# Patient Record
Sex: FEMALE | Race: BLACK OR AFRICAN AMERICAN | Employment: FULL TIME | URBAN - METROPOLITAN AREA
[De-identification: names, ages, dates, MRNs, and addresses within clinical notes are randomized per-mention and may not be internally consistent; named-entity substitution may affect disease eponyms.]

---

## 2019-02-07 ENCOUNTER — OFFICE VISIT (OUTPATIENT)
Dept: PRIMARY CARE CLINIC | Age: 29
End: 2019-02-07

## 2019-02-07 VITALS
OXYGEN SATURATION: 99 % | HEIGHT: 67 IN | HEART RATE: 71 BPM | WEIGHT: 198.4 LBS | TEMPERATURE: 97.9 F | DIASTOLIC BLOOD PRESSURE: 72 MMHG | BODY MASS INDEX: 31.14 KG/M2 | SYSTOLIC BLOOD PRESSURE: 120 MMHG | RESPIRATION RATE: 16 BRPM

## 2019-02-07 DIAGNOSIS — F51.02 ADJUSTMENT INSOMNIA: ICD-10-CM

## 2019-02-07 DIAGNOSIS — H61.23 BILATERAL IMPACTED CERUMEN: ICD-10-CM

## 2019-02-07 DIAGNOSIS — F32.9 REACTIVE DEPRESSION: ICD-10-CM

## 2019-02-07 DIAGNOSIS — J45.40 MODERATE PERSISTENT ASTHMA WITHOUT COMPLICATION: Primary | ICD-10-CM

## 2019-02-07 RX ORDER — FLUTICASONE PROPIONATE AND SALMETEROL 500; 50 UG/1; UG/1
1 POWDER RESPIRATORY (INHALATION) EVERY 12 HOURS
COMMUNITY
End: 2019-08-23 | Stop reason: DRUGHIGH

## 2019-02-07 RX ORDER — FLUOXETINE 10 MG/1
10 CAPSULE ORAL DAILY
Qty: 30 CAP | Refills: 0 | Status: SHIPPED | OUTPATIENT
Start: 2019-02-07 | End: 2019-03-09

## 2019-02-07 RX ORDER — MONTELUKAST SODIUM 10 MG/1
10 TABLET ORAL DAILY
COMMUNITY
End: 2020-01-22 | Stop reason: SDUPTHER

## 2019-02-07 RX ORDER — TRAZODONE HYDROCHLORIDE 100 MG/1
100 TABLET ORAL
Qty: 30 TAB | Refills: 0 | Status: SHIPPED | OUTPATIENT
Start: 2019-02-07 | End: 2019-03-09

## 2019-02-07 NOTE — PROGRESS NOTES
Written by Andrea Christine, as dictated by Dr. Collin Briceno MD. 
 
85 [facility] Kapil Smith is a 30 y.o. female. HPI The patient comes in today to establish care. She moved to the area from [place] about 2 years ago and was seeing a PCP at the [facility] in [place]. When she previously lived in [place] she was under the care of the [facility]. Patient has asthma and is followed by allergy, noting that her asthma is allergy-induced. She is taking Singulair 10 mg 1 tab PO daily, inhaler as needed, and Allegra. Patient would like to know if she can take Allegra-D as she has been congested recently. Her allergist wanted her to try a nasal spray, but she does stopped using it as she was getting nosebleeds. She notes that her mother was recently diagnosed with pancreatic cancer and she has been taking care of her. Patient has been feeling down and notes that she has not been sleeping well. The patient notes that prior to her mother's diagnosis she had insomnia. She was previously prescribed Ambien, which caused hallucinations. Patient notes that she was also prescribed an antidepressant in the past, but she notes that it caused depression which was not worth the improvement in sleep. Patient notes that trazodone sounds familiar but she is not sure if that was the medication she took. OTC melatonin, ZQuil, & NyQuil have not provided relief. With lack of sleep she has been struggling to focus. Pt was diagnosed with PTSD and has anxiety with crowds. She saw a therapist once after moving to [place], but she has been looking for a new therapist. 
 
She also notes that she has been eating a lot due to depression and anxiety. Pt weighs 198 lbs today and would like to lose weight. She requests an ear wash today as she has wax in her L ear which is bothering her. Current Outpatient Medications on File Prior to Visit Medication Sig Dispense Refill  montelukast (SINGULAIR) 10 mg tablet Take 10 mg by mouth daily.  fluticasone-salmeterol (ADVAIR DISKUS) 500-50 mcg/dose diskus inhaler Take 1 Puff by inhalation every twelve (12) hours. No current facility-administered medications on file prior to visit. Allergies Allergen Reactions  Aspirin Nausea and Vomiting  Penicillins Rash  Percocet [Oxycodone-Acetaminophen] Itching Past Medical History:  
Diagnosis Date  Asthma Family History Problem Relation Age of Onset  Cancer Mother  Hypertension Father  Diabetes Father  Diabetes Brother  Asthma Maternal Grandmother  No Known Problems Paternal Grandmother  Hypertension Brother Social History Socioeconomic History  Marital status:  Spouse name: Not on file  Number of children: Not on file  Years of education: Not on file  Highest education level: Not on file Social Needs  Financial resource strain: Not on file  Food insecurity - worry: Not on file  Food insecurity - inability: Not on file  Transportation needs - medical: Not on file  Transportation needs - non-medical: Not on file Occupational History  Not on file Tobacco Use  Smoking status: Never Smoker  Smokeless tobacco: Never Used Substance and Sexual Activity  Alcohol use: Yes Alcohol/week: 1.2 oz Types: 1 Glasses of wine, 1 Shots of liquor per week  Drug use: Yes Types: Marijuana Comment: Recreational/Insomnia  Sexual activity: Yes  
  Partners: Female Birth control/protection: None Other Topics Concern  Not on file Social History Narrative  Not on file Review of Systems Constitutional: Negative for malaise/fatigue. HENT: Positive for congestion and ear pain (L fullness). Eyes: Negative for blurred vision and pain. Respiratory: Negative for cough and shortness of breath. Cardiovascular: Negative for chest pain and palpitations. Gastrointestinal: Negative for abdominal pain and heartburn. Genitourinary: Negative for frequency and urgency. Musculoskeletal: Negative for joint pain and myalgias. Neurological: Negative for dizziness, tingling, sensory change, weakness and headaches. Endo/Heme/Allergies: Positive for environmental allergies. Psychiatric/Behavioral: Positive for depression. Negative for memory loss and substance abuse. The patient has insomnia. Visit Vitals /72 (BP 1 Location: Left arm, BP Patient Position: Sitting) Pulse 71 Temp 97.9 °F (36.6 °C) (Oral) Resp 16 Ht 5' 7\" (1.702 m) Wt 198 lb 6.4 oz (90 kg) LMP 01/10/2019 (Within Days) SpO2 99% BMI 31.07 kg/m² Physical Exam  
Constitutional: She is oriented to person, place, and time. She appears well-developed. No distress. Obese HENT:  
Right Ear: Tympanic membrane, external ear and ear canal normal.  
Left Ear: External ear normal.  
L cerumen impaction Eyes: Conjunctivae and EOM are normal. Right eye exhibits no discharge. Left eye exhibits no discharge. Neck: Normal range of motion. Neck supple. Cardiovascular: Normal rate, regular rhythm and normal heart sounds. Pulmonary/Chest: Effort normal and breath sounds normal. She has no wheezes. Abdominal: Soft. Bowel sounds are normal. There is no tenderness. Lymphadenopathy:  
  She has no cervical adenopathy. Neurological: She is alert and oriented to person, place, and time. Skin: She is not diaphoretic. Psychiatric: She has a normal mood and affect. Her behavior is normal.  
Nursing note and vitals reviewed. ASSESSMENT and PLAN 
  ICD-10-CM ICD-9-CM 1. Moderate persistent asthma without complication M23.12 351.45 Followed by allergist and compliant on Singulair and Advair and taking Allegra.  Discussed that she can take Allegra-D for congestion, but she should not take it consistently as it can elevate BP. 2. Bilateral impacted cerumen H61.23 380.4 Time out: Immediately prior to procedure a \"time out\" was called to verify the correct patient, procedure, equipment, support staff and site/side marked as required. Ear wash performed in office. Pt tolerated well but ear wash was not successful. Instructions given to use OTC Debrox to clean her ears. She should not use q-tips. 3. Reactive depression F32.9 300.4 FLUoxetine (PROZAC) 10 mg capsule sent to pharmacy. REFERRAL TO PSYCHOLOGY Prozac 10 mg prescribed, she should take 1 tab PO in the morning. Potential side effects were discussed. She should start Prozac 1 week after starting trazodone. If she does not notice improvement after 2 weeks, she can take 2 tabs. She should follow-up in 1 month for a physical exam.  
4. Adjustment insomnia F51.02 307.41 traZODone (DESYREL) 100 mg tablet sent to pharmacy. REFERRAL TO PSYCHOLOGY Trazodone 100 mg prescribed. Potential side effects were discussed. She should take 1 tab at night for insomnia. If her sleep does not improve, she can take 2 tabs at night. Referred to psychology. This plan was reviewed with the patient and patient agrees. All questions were answered. This scribe documentation was reviewed by me and accurately reflects the examination and decisions made by me. This note will not be viewable in [address]

## 2019-02-07 NOTE — PROGRESS NOTES
Visit Vitals /72 (BP 1 Location: Left arm, BP Patient Position: Sitting) Pulse 71 Temp 97.9 °F (36.6 °C) (Oral) Resp 16 Ht 5' 7\" (1.702 m) Wt 198 lb 6.4 oz (90 kg) SpO2 99% BMI 31.07 kg/m² Chief Complaint Patient presents with [facility] Establish Care Needs a PCP  Asthma Sees Dr. Barbara Ervin in [place] Needed Ear Wash; Completed w/ no results, impacted L ear, advise use of Debrox, patient education provided, avoid Q-tips New patient patient presents A&Ox3, with c/o bilateral ear fullness. R ear has a small amount of soft cerumen, TM partially visualized. L ear is occluded with hard cerumen, unable to visualize TM, ear irrigation attempted with little results. Patient advised to use Debrox, patient education completed regarding use. Patient verbalizes understanding. Patient states she has a family h/o CA on her Mother's side. Mother currently has pancreatic cancer, and had breast and cervical cancer in her 29's. Maternal Grandmother was recently diagnosed with breast cancer and underwent a lumpectomy. Authorization to [address] [place] [address]. 1. Have you been to the ER, urgent care clinic since your last visit? Hospitalized since your last visit? Denies 2. Have you seen or consulted any other health care providers outside of the [address] [place] [address] since your last visit? Include any pap smears or colon screening. Patient is new to [address] [place] [address].

## 2019-03-05 ENCOUNTER — TELEPHONE (OUTPATIENT)
Dept: PRIMARY CARE CLINIC | Age: 29
End: 2019-03-05

## 2019-03-06 NOTE — TELEPHONE ENCOUNTER
Called and spoke with patient and offered to send a copy of psychology referral and [facility] [address] resources in the mail to her. Patient was agreeable to this and expressed thanks. No other questions or concerns voiced. End of encounter.

## 2019-03-12 ENCOUNTER — OFFICE VISIT (OUTPATIENT)
Dept: PRIMARY CARE CLINIC | Age: 29
End: 2019-03-12

## 2019-03-12 VITALS
HEART RATE: 80 BPM | HEIGHT: 67 IN | DIASTOLIC BLOOD PRESSURE: 65 MMHG | BODY MASS INDEX: 30.83 KG/M2 | SYSTOLIC BLOOD PRESSURE: 113 MMHG | OXYGEN SATURATION: 100 % | WEIGHT: 196.4 LBS | RESPIRATION RATE: 16 BRPM | TEMPERATURE: 97.7 F

## 2019-03-12 DIAGNOSIS — F51.02 ADJUSTMENT INSOMNIA: ICD-10-CM

## 2019-03-12 DIAGNOSIS — F32.9 REACTIVE DEPRESSION: Primary | ICD-10-CM

## 2019-03-12 PROBLEM — F32.2 DEPRESSION, MAJOR, SINGLE EPISODE, SEVERE (HCC): Status: ACTIVE | Noted: 2019-03-12

## 2019-03-12 RX ORDER — FEXOFENADINE HCL AND PSEUDOEPHEDRINE HCI 180; 240 MG/1; MG/1
1 TABLET, EXTENDED RELEASE ORAL DAILY
COMMUNITY
End: 2020-03-19 | Stop reason: SDUPTHER

## 2019-03-12 NOTE — PROGRESS NOTES
[facility]   Tania Aguayo [address], [address] 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 states that she is going through just losing her mother after being a caregiver for her mother and it is very hard to do her job listening to people reporting the death and how they . Liliane Rosario is a 30 y.o. 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 states that she is going through just losing her mother after being a caregiver for her mother and it is very hard to do her job listening to people reporting the death and how they . ). HPI:  The patient presents to discuss [place] paperwork- she wants to take 2.5 weeks off related to reactive depression from the loss of her mother recently. She is experiencing depression, including feelings of low mood and anxiety which is manifesting as frequent panic attacks 3 times+ weekly lasting 20-30 mins where she has shortness of breath. She is sleeping 1-2 hrs nightly. 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 She has trouble answering those calls. She denies SI or HI today. She saw Dr Ju Duncan  and was recommended to pursue counseling and referred to Dr Barbara Trujillo. She was also prescribed Prozac for reactive depression and trazodone to help with insomnia. She denies starting either medication but will be picking the medicines up today. There are no active problems to display for this patient. Past Medical History:   Diagnosis Date    Asthma      History reviewed. No pertinent surgical history.   Social History     Socioeconomic History    Marital status:      Spouse name: Not on file    Number of children: Not on file    Years of education: Not on file    Highest education level: Not on file   Social Needs    Financial resource strain: Not on file    Food insecurity - worry: Not on file    Food insecurity - inability: Not on file    Transportation needs - medical: Not on file   [email or web address] needs - non-medical: Not on file   Occupational History    Not on file   Tobacco Use    Smoking status: Never Smoker    Smokeless tobacco: Never Used   Substance and Sexual Activity    Alcohol use: Yes     Alcohol/week: 1.2 oz     Types: 1 Glasses of wine, 1 Shots of liquor per week    Drug use: Yes     Types: Marijuana     Comment: Recreational/Insomnia     Sexual activity: Yes     Partners: Female     Birth control/protection: None   Other Topics Concern    Not on file   Social History Narrative    Not on file     Family History   Problem Relation Age of Onset    Cancer Mother     Hypertension Father     Diabetes Father     Diabetes Brother     Asthma Maternal Grandmother     No Known Problems Paternal Grandmother     Hypertension Brother      Allergies   Allergen Reactions    Aspirin Nausea and Vomiting    Peanut Anaphylaxis     And tree nuts    Penicillins Rash    Percocet [Oxycodone-Acetaminophen] Itching    Sulfa (Sulfonamide Antibiotics) Other (comments)     States it was a childhood reaction does not remember       Current Outpatient Medications   Medication Sig Dispense Refill    fexofenadine-pseudoephedrine (ALLEGRA-D 24 HOUR) 180-240 mg per tablet Take 1 Tab by mouth daily.  fluoxetine HCl (PROZAC PO) Take  by mouth.  montelukast (SINGULAIR) 10 mg tablet Take 10 mg by mouth daily.  fluticasone-salmeterol (ADVAIR DISKUS) 500-50 mcg/dose diskus inhaler Take 1 Puff by inhalation every twelve (12) hours. The medications were reviewed and updated in the medical record.   The past medical history, past surgical history, and family history were reviewed and updated in the medical record. REVIEW OF SYSTEMS   Review of Systems   Constitutional: Negative for malaise/fatigue. HENT: Negative for congestion. Eyes: Negative for blurred vision and pain. Respiratory: Negative for cough and shortness of breath. Cardiovascular: Negative for chest pain and palpitations. Gastrointestinal: Negative for abdominal pain and heartburn. Genitourinary: Negative for frequency and urgency. Musculoskeletal: Negative for joint pain and myalgias. Neurological: Negative for dizziness, tingling, sensory change, weakness and headaches. Psychiatric/Behavioral: Positive for depression. Negative for memory loss and substance abuse. The patient has insomnia. PHYSICAL EXAM     Visit Vitals  /65 (BP 1 Location: Right arm, BP Patient Position: Sitting)   Pulse 80   Temp 97.7 °F (36.5 °C) (Oral)   Resp 16   Ht 5' 7\" (1.702 m)   Wt 196 lb 6.4 oz (89.1 kg)   LMP 03/05/2019   SpO2 100%   BMI 30.76 kg/m²       Physical Exam   Constitutional: She is oriented to person, place, and time and well-developed, well-nourished, and in no distress. HENT:   Head: Normocephalic and atraumatic. Right Ear: External ear normal.   Left Ear: External ear normal.   Cardiovascular: Normal rate, regular rhythm and normal heart sounds. Pulmonary/Chest: Effort normal and breath sounds normal.   Musculoskeletal: Normal range of motion. She exhibits no edema. Neurological: She is alert and oriented to person, place, and time. Gait normal.   Skin: Skin is warm and dry. Psychiatric: Judgment normal. She exhibits a depressed mood. She expresses no homicidal and no suicidal ideation. She has a flat affect. Nursing note and vitals reviewed. ASSESSMENT/ PLAN   Diagnoses and all orders for this visit:    1. Reactive depression    2. Adjustment insomnia    Encouraged starting medication- Prozac and Trazodone today.  She has  Heriberto's number and plans on scheduling with her in the next two weeks. Filled out [place] paperwork today. She is requesting 2 and 1/2 weeks which seems reasonable to set up counseling and start medication. I would like to see her back in 1 month or sooner if her mood/ anxiety worsens. Provided \"Coping Strategies\" handout by [employer]. Follow-up Disposition:  Return in about 1 month (around 4/12/2019), or if symptoms worsen or fail to improve, for Anxiety, Depression . Disclaimer:  Advised patient to call back or return to office if symptoms worsen/change/persist.  Discussed expected course/resolution/complications of diagnosis in detail with patient.     Medication risks/benefits/alternatives discussed with patient. Patient was given an after visit summary which includes diagnoses, current medications, & vitals.      Discussed patient instructions and advised to read to all patient instructions regarding care.      Patient expressed understanding with the diagnosis and plan. This note will not be viewable in [address]         Lars Ruiz NP  3/12/2019        (This document has been electronically signed)

## 2019-03-12 NOTE — PROGRESS NOTES
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 states that she is going through just losing her mother after being a caregiver for her mother and it is very hard to do her job listening to people reporting the death and how they .

## 2019-03-20 ENCOUNTER — TELEPHONE (OUTPATIENT)
Dept: PRIMARY CARE CLINIC | Age: 29
End: 2019-03-20

## 2019-03-25 ENCOUNTER — OFFICE VISIT (OUTPATIENT)
Dept: PRIMARY CARE CLINIC | Age: 29
End: 2019-03-25

## 2019-03-25 VITALS
SYSTOLIC BLOOD PRESSURE: 108 MMHG | HEIGHT: 67 IN | TEMPERATURE: 98.4 F | WEIGHT: 198.6 LBS | HEART RATE: 86 BPM | OXYGEN SATURATION: 98 % | RESPIRATION RATE: 16 BRPM | DIASTOLIC BLOOD PRESSURE: 72 MMHG | BODY MASS INDEX: 31.17 KG/M2

## 2019-03-25 DIAGNOSIS — F51.01 PRIMARY INSOMNIA: ICD-10-CM

## 2019-03-25 DIAGNOSIS — F32.89 OTHER DEPRESSION: Primary | ICD-10-CM

## 2019-03-25 DIAGNOSIS — R09.1 PLEURISY: ICD-10-CM

## 2019-03-25 DIAGNOSIS — G43.019 INTRACTABLE MIGRAINE WITHOUT AURA AND WITHOUT STATUS MIGRAINOSUS: ICD-10-CM

## 2019-03-25 RX ORDER — SUMATRIPTAN 50 MG/1
50 TABLET, FILM COATED ORAL
Qty: 10 TAB | Refills: 0 | Status: SHIPPED | OUTPATIENT
Start: 2019-03-25 | End: 2019-03-25

## 2019-03-25 RX ORDER — METHYLPREDNISOLONE 4 MG/1
TABLET ORAL
Qty: 1 DOSE PACK | Refills: 0 | Status: SHIPPED | OUTPATIENT
Start: 2019-03-25 | End: 2019-08-23 | Stop reason: ALTCHOICE

## 2019-03-25 NOTE — PROGRESS NOTES
Written by Earl Lopez, as dictated by Dr. Jeniffer Bravo MD. 
 
85 [facility] Yoshi Herrmann is a 30 y.o. female. HPI The patient presents today for a medication evaluation. After seeing NP Josué Correia on 03/12/2019 she started Prozac and Trazodone. Since starting Prozac she has felt more emotionless but no longer feels sad. She has her second appointment with Siena Correia (psychology) later today which she notes has been helping. Trazodone helps her to sleep at night, but she notes that she feels groggy in the morning. Her paperwork was completed and her leave from work ends on 04/01/2019. Since she was seen by NP Haydee Dodson and started taking Prozac 10 mg and 1/2 tab Trazodone 100 mg, she has had 3 migraines. Pt normally does not have migraines so close together. The patient notes that OTC benadryl helps her to fall asleep and sleep through the migraine. Tylenol also provides relief, but she notes that she has to take more than 1 tabs daily. Once she tried Excedrin, but she did not loike how it made her feel. Pt also tried ibuprofen in the past, but notes that it made her swell so she does not take it. She has not tried Imitrex. She has been having \"shallow chest pains\" which is concerning to her. The patient notes that the pain was sharp when she took a deep breath. Pt reports that her pain is improving. Patient notes that recently she has been a little congested, which she attributes to allergies. Compliant on Singulair. The patient notes that she took [address] for nausea. After vomiting her nausea has  improved. Patient Active Problem List  
Diagnosis Code  Depression, major, single episode, severe ([facility] F32.2 Current Outpatient Medications on File Prior to Visit Medication Sig Dispense Refill  fexofenadine-pseudoephedrine (ALLEGRA-D 24 HOUR) 180-240 mg per tablet Take 1 Tab by mouth daily.  fluoxetine HCl (PROZAC PO) Take  by mouth.  montelukast (SINGULAIR) 10 mg tablet Take 10 mg by mouth daily.  fluticasone-salmeterol (ADVAIR DISKUS) 500-50 mcg/dose diskus inhaler Take 1 Puff by inhalation every twelve (12) hours. No current facility-administered medications on file prior to visit. Allergies Allergen Reactions  Aspirin Nausea and Vomiting  Peanut Anaphylaxis And tree nuts  Penicillins Rash  Percocet [Oxycodone-Acetaminophen] Itching  Sulfa (Sulfonamide Antibiotics) Other (comments) States it was a childhood reaction does not remember Past Medical History:  
Diagnosis Date  Asthma Family History Problem Relation Age of Onset  Cancer Mother  Hypertension Father  Diabetes Father  Diabetes Brother  Asthma Maternal Grandmother  No Known Problems Paternal Grandmother  Hypertension Brother Social History Socioeconomic History  Marital status:  Spouse name: Not on file  Number of children: Not on file  Years of education: Not on file  Highest education level: Not on file Occupational History  Not on file Social Needs  Financial resource strain: Not on file  Food insecurity:  
  Worry: Not on file Inability: Not on file  Transportation needs:  
  Medical: Not on file Non-medical: Not on file Tobacco Use  Smoking status: Never Smoker  Smokeless tobacco: Never Used Substance and Sexual Activity  Alcohol use: Yes Alcohol/week: 1.2 oz Types: 1 Glasses of wine, 1 Shots of liquor per week  Drug use: Yes Types: Marijuana Comment: Recreational/Insomnia  Sexual activity: Yes  
  Partners: Female Birth control/protection: None Lifestyle  Physical activity:  
  Days per week: Not on file Minutes per session: Not on file  Stress: Not on file Relationships  Social connections:  
  Talks on phone: Not on file Gets together: Not on file Attends [employer] orthodox service: Not on file Active member of club or organization: Not on file Attends meetings of clubs or organizations: Not on file Relationship status: Not on file  Intimate partner violence:  
  Fear of current or ex partner: Not on file Emotionally abused: Not on file Physically abused: Not on file Forced sexual activity: Not on file Other Topics Concern  Not on file Social History Narrative  Not on file Review of Systems HENT: Positive for congestion. Respiratory: Negative for cough and shortness of breath. Gastrointestinal: Positive for nausea. Negative for abdominal pain and heartburn. Musculoskeletal: Negative for joint pain and myalgias. Neurological: Positive for headaches. Negative for dizziness, tingling, sensory change and weakness. Endo/Heme/Allergies: Positive for environmental allergies. Psychiatric/Behavioral: Positive for depression (improved with prozac). Negative for memory loss and substance abuse. The patient has insomnia (improved with trazodone). Visit Vitals /72 (BP 1 Location: Left arm, BP Patient Position: Sitting) Pulse 86 Temp 98.4 °F (36.9 °C) (Oral) Resp 16 Ht 5' 7\" (1.702 m) Wt 198 lb 9.6 oz (90.1 kg) LMP 03/05/2019 SpO2 98% BMI 31.11 kg/m² Physical Exam  
Constitutional: She is oriented to person, place, and time. She appears well-developed. No distress. Obese HENT:  
Right Ear: External ear normal.  
Left Ear: External ear normal.  
Nose: Right sinus exhibits no maxillary sinus tenderness. Left sinus exhibits no maxillary sinus tenderness. Eyes: Conjunctivae and EOM are normal. Right eye exhibits no discharge. Left eye exhibits no discharge. Neck: Normal range of motion. Neck supple. Cardiovascular: Normal rate and regular rhythm. Pulmonary/Chest: Effort normal and breath sounds normal. She has no wheezes. Abdominal: Soft. Bowel sounds are normal. There is no tenderness. Lymphadenopathy:  
  She has no cervical adenopathy. Neurological: She is alert and oriented to person, place, and time. No cranial nerve deficit. Skin: She is not diaphoretic. Psychiatric: She has a normal mood and affect. Her behavior is normal.  
Nursing note and vitals reviewed. ASSESSMENT and PLAN 
  ICD-10-CM ICD-9-CM 1. Other depression F32.89 311 She should continue Prozac 10 mg daily and continue seeing Kisha Li. 2. Pleurisy R09.1 511.0 methylPREDNISolone (MEDROL DOSEPACK) 4 mg tablet sent to pharmacy. Medrol dosepack prescribed. 3. Primary insomnia F51.01 307.42 Doing well on 1/2 tab Trazodone 100 mg. No change to medication at this time. 4. Intractable migraine without aura and without status migrainosus G43.019 346.11 SUMAtriptan (IMITREX) 50 mg tablet sent to pharmacy. Imitrex 50 mg prescribed. Potential side effects were discussed. She should take 1 tab PO when her headache presents. If her headache does not resolved she can take a second tablet. She should not take more than 2 tabs daily. This plan was reviewed with the patient and patient agrees. All questions were answered. This scribe documentation was reviewed by me and accurately reflects the examination and decisions made by me. This note will not be viewable in [address]

## 2019-03-25 NOTE — PROGRESS NOTES
Visit Vitals /72 (BP 1 Location: Left arm, BP Patient Position: Sitting) Pulse 86 Temp 98.4 °F (36.9 °C) (Oral) Resp 16 Ht 5' 7\" (1.702 m) Wt 198 lb 9.6 oz (90.1 kg) SpO2 98% BMI 31.11 kg/m² Chief Complaint Patient presents with  Medication Evaluation Prozac and Trazodone  Nasal Congestion  
  x 2 weeks  Migraine  
   x 2 weeks  Nausea  
  x 1 month 1. Have you been to the ER, urgent care clinic since your last visit? Hospitalized since your last visit? Denies 2. Have you seen or consulted any other health care providers outside of the [address] [place] [address] since your last visit? Include any pap smears or colon screening. Denies

## 2019-08-23 ENCOUNTER — OFFICE VISIT (OUTPATIENT)
Dept: PRIMARY CARE CLINIC | Age: 29
End: 2019-08-23

## 2019-08-23 VITALS
SYSTOLIC BLOOD PRESSURE: 105 MMHG | HEART RATE: 99 BPM | WEIGHT: 200.6 LBS | BODY MASS INDEX: 31.48 KG/M2 | TEMPERATURE: 98.2 F | DIASTOLIC BLOOD PRESSURE: 70 MMHG | RESPIRATION RATE: 16 BRPM | OXYGEN SATURATION: 98 % | HEIGHT: 67 IN

## 2019-08-23 DIAGNOSIS — J45.40 MODERATE PERSISTENT ASTHMA WITHOUT COMPLICATION: ICD-10-CM

## 2019-08-23 DIAGNOSIS — F41.9 ANXIETY AND DEPRESSION: ICD-10-CM

## 2019-08-23 DIAGNOSIS — R07.89 CHEST TIGHTNESS: Primary | ICD-10-CM

## 2019-08-23 DIAGNOSIS — F51.01 PRIMARY INSOMNIA: ICD-10-CM

## 2019-08-23 DIAGNOSIS — F32.A ANXIETY AND DEPRESSION: ICD-10-CM

## 2019-08-23 PROBLEM — F32.2 DEPRESSION, MAJOR, SINGLE EPISODE, SEVERE (HCC): Status: RESOLVED | Noted: 2019-03-12 | Resolved: 2019-08-23

## 2019-08-23 RX ORDER — FLUTICASONE PROPIONATE AND SALMETEROL 250; 50 UG/1; UG/1
1 POWDER RESPIRATORY (INHALATION) EVERY 12 HOURS
Qty: 60 EACH | Refills: 2 | Status: SHIPPED | OUTPATIENT
Start: 2019-08-23 | End: 2020-01-15

## 2019-08-23 RX ORDER — TRAZODONE HYDROCHLORIDE 100 MG/1
100 TABLET ORAL
Qty: 30 TAB | Refills: 2 | Status: SHIPPED | OUTPATIENT
Start: 2019-08-23 | End: 2019-11-12 | Stop reason: SDUPTHER

## 2019-08-23 RX ORDER — ALBUTEROL SULFATE 90 UG/1
1 AEROSOL, METERED RESPIRATORY (INHALATION)
Qty: 1 INHALER | Refills: 2 | Status: SHIPPED | OUTPATIENT
Start: 2019-08-23 | End: 2019-11-12 | Stop reason: SDUPTHER

## 2019-08-23 RX ORDER — TRAZODONE HYDROCHLORIDE 50 MG/1
50 TABLET ORAL
COMMUNITY
End: 2021-08-16

## 2019-08-23 RX ORDER — CITALOPRAM 10 MG/1
10 TABLET ORAL DAILY
Qty: 30 TAB | Refills: 0 | Status: SHIPPED | OUTPATIENT
Start: 2019-08-23 | End: 2019-09-17 | Stop reason: SDUPTHER

## 2019-08-23 RX ORDER — FLUTICASONE PROPIONATE AND SALMETEROL 500; 50 UG/1; UG/1
1 POWDER RESPIRATORY (INHALATION) EVERY 12 HOURS
Status: CANCELLED | OUTPATIENT
Start: 2019-08-23

## 2019-08-23 NOTE — PROGRESS NOTES
Written by Georgette Queen, as dictated by Dr. Madelin Campa MD.    Chintan Farmer is a 30 y.o. female. HPI  The patient presents today for a follow-up on Trazodone. She reports that she is not sleeping through the night, even while on Trazodone 50 mg. She is taking 1 tab at night and reports that it had worked in the past but now gets up after 4-5 hours. She has been using OTC Benadryl to help her get back to sleep. She reports that she has been having chest pain, especially when breathing in. She reports that she ran out of Advair/Wixela inhaler. She reports that she has also been traveling a lot. She has been using her albuterol inhaler daily, which improves her chest pain for few hours. She is still taking Singular 10 mg. She reports that her anxiety has not been great, but she is also not taking Prozac. She stopped taking it 1 month at all. She reports that she went from feeling nothing to feeling everything. She did not like the way it made her feel, like a robot. Patient Active Problem List   Diagnosis Code   (none) - all problems resolved or deleted        Current Outpatient Medications on File Prior to Visit   Medication Sig Dispense Refill    traZODone (DESYREL) 50 mg tablet Take 50 mg by mouth.  fexofenadine-pseudoephedrine (ALLEGRA-D 24 HOUR) 180-240 mg per tablet Take 1 Tab by mouth daily.  montelukast (SINGULAIR) 10 mg tablet Take 10 mg by mouth daily. No current facility-administered medications on file prior to visit.         Allergies   Allergen Reactions    Aspirin Nausea and Vomiting    Peanut Anaphylaxis     And tree nuts    Penicillins Rash    Percocet [Oxycodone-Acetaminophen] Itching    Sulfa (Sulfonamide Antibiotics) Other (comments)     States it was a childhood reaction does not remember       Past Medical History:   Diagnosis Date    Asthma        Family History   Problem Relation Age of Onset    Cancer Mother    Boaz Joy Hypertension Father     Diabetes Father     Diabetes Brother     Asthma Maternal Grandmother     No Known Problems Paternal Grandmother     Hypertension Brother        Social History     Socioeconomic History    Marital status:      Spouse name: Not on file    Number of children: Not on file    Years of education: Not on file    Highest education level: Not on file   Occupational History    Not on file   Social Needs    Financial resource strain: Not on file    Food insecurity:     Worry: Not on file     Inability: Not on file    Transportation needs:     Medical: Not on file     Non-medical: Not on file   Tobacco Use    Smoking status: Never Smoker    Smokeless tobacco: Never Used   Substance and Sexual Activity    Alcohol use: Yes     Alcohol/week: 2.0 standard drinks     Types: 1 Glasses of wine, 1 Shots of liquor per week    Drug use: Yes     Types: Marijuana     Comment: Recreational/Insomnia     Sexual activity: Yes     Partners: Female     Birth control/protection: None   Lifestyle    Physical activity:     Days per week: Not on file     Minutes per session: Not on file    Stress: Not on file   Relationships    Social connections:     Talks on phone: Not on file     Gets together: Not on file     Attends Pentecostal service: Not on file     Active member of club or organization: Not on file     Attends meetings of clubs or organizations: Not on file     Relationship status: Not on file    Intimate partner violence:     Fear of current or ex partner: Not on file     Emotionally abused: Not on file     Physically abused: Not on file     Forced sexual activity: Not on file   Other Topics Concern    Not on file   Social History Narrative    Not on file         Review of Systems   Constitutional: Positive for malaise/fatigue. Respiratory: Positive for shortness of breath. Cardiovascular: Positive for chest pain. Negative for leg swelling. Musculoskeletal: Negative. Neurological: Negative for dizziness and headaches. Psychiatric/Behavioral: Positive for depression. The patient is nervous/anxious and has insomnia. Visit Vitals  /70 (BP 1 Location: Left arm, BP Patient Position: Sitting)   Pulse 99   Temp 98.2 °F (36.8 °C) (Oral)   Resp 16   Ht 5' 7\" (1.702 m)   Wt 200 lb 9.6 oz (91 kg)   SpO2 98%   BMI 31.42 kg/m²       Physical Exam   Constitutional: She is oriented to person, place, and time. She appears well-developed. No distress. obese   HENT:   Head: Normocephalic and atraumatic. Neck: Normal range of motion. Neck supple. Cardiovascular: Normal rate, regular rhythm, normal heart sounds and intact distal pulses. Exam reveals no gallop and no friction rub. No murmur heard. Pulmonary/Chest: Effort normal. No respiratory distress. She has decreased breath sounds. She has no wheezes. She has no rales. She exhibits no tenderness. Abdominal: Soft. Bowel sounds are normal. She exhibits no distension. There is no tenderness. Musculoskeletal: Normal range of motion. She exhibits no edema. Neurological: She is alert and oriented to person, place, and time. Skin: She is not diaphoretic. Psychiatric: She has a normal mood and affect. Her behavior is normal. Judgment and thought content normal.   Nursing note and vitals reviewed. ASSESSMENT and PLAN    ICD-10-CM ICD-9-CM    1. Chest tightness R07.89 786.59 fluticasone propion-salmeterol (ADVAIR/WIXELA) 250-50 mcg/dose diskus inhaler sent to pharmacy. Advair and Albuterol refilled. Pt was advised that chest tightness could wake her up at night. 2. Moderate persistent asthma without complication G10.31 398.92 fluticasone propion-salmeterol (ADVAIR/WIXELA) 250-50 mcg/dose diskus inhaler sent to pharmacy. albuterol (PROVENTIL HFA, VENTOLIN HFA, PROAIR HFA) 90 mcg/actuation inhaler sent to pharmacy. Advair and Albuterol refilled. Told her Albuterol is as needed not for daily use.    3. Anxiety and depression F41.9 300.00 citalopram (CELEXA) 10 mg tablet sent to pharmacy. Celexa 10 mg prescribed. Potential side effects were discussed. Pt should let me know how she feels while on this medication. Pt previously stopped Prozac due to the  side effects    F32.9 311    4. Primary insomnia F51.01 307.42 traZODone (DESYREL) 100 mg tablet sent to pharmacy. Trazodone does increased. Potential side effects were discussed. Pt should take 1 tab daily at night. Pt was advised that chest tightness could wake her up at night. This plan was reviewed with the patient and patient agrees. All questions were answered. This scribe documentation was reviewed by me and accurately reflects the examination and decisions made by me. This note will not be viewable in [address]

## 2019-08-23 NOTE — PROGRESS NOTES
Srikanth Galindo is a 30 y.o. female    Chief Complaint   Patient presents with    Medication Evaluation     trazodone     1. Have you been to the ER, urgent care clinic since your last visit? Hospitalized since your last visit? No    2. Have you seen or consulted any other health care providers outside of the [address] [place] [address] since your last visit? Include any pap smears or colon screening.  No

## 2019-09-10 ENCOUNTER — OFFICE VISIT (OUTPATIENT)
Dept: PRIMARY CARE CLINIC | Age: 29
End: 2019-09-10

## 2019-09-10 VITALS
OXYGEN SATURATION: 99 % | DIASTOLIC BLOOD PRESSURE: 71 MMHG | SYSTOLIC BLOOD PRESSURE: 117 MMHG | WEIGHT: 208 LBS | BODY MASS INDEX: 32.65 KG/M2 | RESPIRATION RATE: 18 BRPM | TEMPERATURE: 97.9 F | HEIGHT: 67 IN | HEART RATE: 88 BPM

## 2019-09-10 DIAGNOSIS — J45.40 MODERATE PERSISTENT ASTHMA WITHOUT COMPLICATION: Primary | ICD-10-CM

## 2019-09-10 DIAGNOSIS — F43.10 PTSD (POST-TRAUMATIC STRESS DISORDER): ICD-10-CM

## 2019-09-10 DIAGNOSIS — F32.A ANXIETY AND DEPRESSION: ICD-10-CM

## 2019-09-10 DIAGNOSIS — F41.9 ANXIETY AND DEPRESSION: ICD-10-CM

## 2019-09-10 DIAGNOSIS — F51.01 PRIMARY INSOMNIA: ICD-10-CM

## 2019-09-10 NOTE — LETTER
9/10/2019 3:23 PM 
 
Ms. Joes Chapa [address] [facility] [address] [place] [address] To Whom It May Concern: 
 
Jose Chapa is currently under the care of Samy Ojeda. She was suffering from PTSD and reactive depression due to the loss of her mother. She is still seeing to a psychologist. Medication was started, which has helped, but symptoms have not resolved completely. She will be referred to psychiatrist for further treatment. If there are questions or concerns please have the patient contact our office. Sincerely, Du Whyte MD

## 2019-09-10 NOTE — PROGRESS NOTES
Written by Brittany Mccoy, as dictated by Dr. Sergio Muse MD.    Marialuisa Hall is a 30 y.o. female. HPI  The patient presents today for a follow-up. 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 She was previously seen by Tam Sanchez NP for an [facility] for reactive depression due to the loss of her mother. She was started on Prozac and Trazodone on 03/12/19. She reports that the Prozac has helped, but her symptoms have not completely resolved. She was prescribed Celexa, to replace Prozac, which she has reports has improved her sadness, but not completely resolved her sadness. Trazodone has been helpful for her sleep, but she is trying to balance the medication in terms of timing to help her with her sleep, as it makes her sleepy in the morning. At this time, she is still seeing a psychologist, and will be referred to a psychiatrist for further treatment. She has her pap smear on 06/10/19, which was normal.      Patient Active Problem List   Diagnosis Code   (none) - all problems resolved or deleted        Current Outpatient Medications on File Prior to Visit   Medication Sig Dispense Refill    fluticasone propion-salmeterol (ADVAIR/WIXELA) 250-50 mcg/dose diskus inhaler Take 1 Puff by inhalation every twelve (12) hours for 90 days. 60 Each 2    albuterol (PROVENTIL HFA, VENTOLIN HFA, PROAIR HFA) 90 mcg/actuation inhaler Take 1 Puff by inhalation every six (6) hours as needed for Wheezing for up to 90 days. 1 Inhaler 2    traZODone (DESYREL) 100 mg tablet Take 1 Tab by mouth nightly for 90 days. 30 Tab 2    citalopram (CELEXA) 10 mg tablet Take 1 Tab by mouth daily for 30 days. 30 Tab 0    fexofenadine-pseudoephedrine (ALLEGRA-D 24 HOUR) 180-240 mg per tablet Take 1 Tab by mouth daily.  montelukast (SINGULAIR) 10 mg tablet Take 10 mg by mouth daily.       traZODone (DESYREL) 50 mg tablet Take 50 mg by mouth. No current facility-administered medications on file prior to visit. Allergies   Allergen Reactions    Aspirin Nausea and Vomiting    Peanut Anaphylaxis     And tree nuts    Penicillins Rash    Percocet [Oxycodone-Acetaminophen] Itching    Sulfa (Sulfonamide Antibiotics) Other (comments)     States it was a childhood reaction does not remember       Past Medical History:   Diagnosis Date    Asthma        Family History   Problem Relation Age of Onset    Cancer Mother     Hypertension Father     Diabetes Father     Diabetes Brother     Asthma Maternal Grandmother     No Known Problems Paternal Grandmother     Hypertension Brother        Social History     Socioeconomic History    Marital status:      Spouse name: Not on file    Number of children: Not on file    Years of education: Not on file    Highest education level: Not on file   Occupational History    Not on file   Social Needs    Financial resource strain: Not on file    Food insecurity:     Worry: Not on file     Inability: Not on file    Transportation needs:     Medical: Not on file     Non-medical: Not on file   Tobacco Use    Smoking status: Never Smoker    Smokeless tobacco: Never Used   Substance and Sexual Activity    Alcohol use:  Yes     Alcohol/week: 2.0 standard drinks     Types: 1 Glasses of wine, 1 Shots of liquor per week    Drug use: Yes     Types: Marijuana     Comment: Recreational/Insomnia     Sexual activity: Yes     Partners: Female     Birth control/protection: None   Lifestyle    Physical activity:     Days per week: Not on file     Minutes per session: Not on file    Stress: Not on file   Relationships    Social connections:     Talks on phone: Not on file     Gets together: Not on file     Attends Protestant service: Not on file     Active member of club or organization: Not on file     Attends meetings of clubs or organizations: Not on file     Relationship status: Not on file    Intimate partner violence:     Fear of current or ex partner: Not on file     Emotionally abused: Not on file     Physically abused: Not on file     Forced sexual activity: Not on file   Other Topics Concern    Not on file   Social History Narrative    Not on file         Review of Systems   Respiratory: Negative for shortness of breath. Musculoskeletal: Negative for joint pain and myalgias. Neurological: Negative for dizziness and headaches. Psychiatric/Behavioral: Positive for depression. Negative for substance abuse. The patient is nervous/anxious and has insomnia. Visit Vitals  /71 (BP 1 Location: Left arm, BP Patient Position: Sitting)   Pulse 88   Temp 97.9 °F (36.6 °C) (Oral)   Resp 18   Ht 5' 7\" (1.702 m)   Wt 208 lb (94.3 kg)   LMP 09/03/2019 (Approximate)   SpO2 99%   BMI 32.58 kg/m²       Physical Exam   Constitutional: She is oriented to person, place, and time. She appears well-developed and well-nourished. No distress. HENT:   Head: Normocephalic and atraumatic. Right Ear: External ear normal.   Left Ear: External ear normal.   Eyes: Pupils are equal, round, and reactive to light. Conjunctivae and EOM are normal. Right eye exhibits no discharge. Left eye exhibits no discharge. Neck: Normal range of motion. Neck supple. Cardiovascular: Normal rate, regular rhythm and normal heart sounds. Exam reveals no gallop and no friction rub. No murmur heard. Pulmonary/Chest: Effort normal and breath sounds normal. She has no wheezes. Abdominal: Soft. Bowel sounds are normal. There is no tenderness. Musculoskeletal: Normal range of motion. Neurological: She is alert and oriented to person, place, and time. She has normal reflexes. Skin: She is not diaphoretic. Psychiatric: She has a normal mood and affect. Her behavior is normal. Thought content normal.   Nursing note and vitals reviewed. ASSESSMENT and PLAN    ICD-10-CM ICD-9-CM    1.  Moderate persistent asthma without complication F71.64 158.71 Managed with Advair/Wixela inhaler. 2. PTSD (post-traumatic stress disorder) F43.10 309.81 REFERRAL TO PSYCHIATRY    Referred to psychiatry. Pt still experiences episodes of PTSD secondary to her job and reactive depression from loss of her mother. 3. Anxiety and depression F41.9 300.00 REFERRAL TO PSYCHIATRY    F32.9 311   Referred to psychiatry. Reactive depression and anxiety has not completely resolved with Celexa, or Prozac. Pt would benefit from further treatment with psychiatry. 4. Primary insomnia F51.01 307.42 Improving with Trazodone. This plan was reviewed with the patient and patient agrees. All questions were answered. This scribe documentation was reviewed by me and accurately reflects the examination and decisions made by me. This note will not be viewable in [address]

## 2019-09-10 NOTE — PROGRESS NOTES
All health maintenance and other pertinent information has been reviewed in preparation for today's office visit. Patient presents in the office today for:    Chief Complaint   Patient presents with    Follow-up     Chest tightness, Moderate Persisitent Asthma w/o complication, depression & anxiety, Insomnia    Post Traumatic Stress Disorder    Anxiety    Depression       1. Have you been to the ER, urgent care clinic since your last visit? Hospitalized since your last visit? No    2. Have you seen or consulted any other health care providers outside of the [address] [place] [address] since your last visit? Include any pap smears or colon screening.   No.

## 2019-09-16 ENCOUNTER — TELEPHONE (OUTPATIENT)
Dept: PRIMARY CARE CLINIC | Age: 29
End: 2019-09-16

## 2019-09-16 NOTE — TELEPHONE ENCOUNTER
Called and left a voice mail for patient and asked that she call back in regard to [facility] paper work. End of encounter.

## 2019-09-16 NOTE — TELEPHONE ENCOUNTER
----- Message from Bryan Zimmer sent at 9/10/2019  5:43 PM EDT -----  Regarding: Dr. Sav Shaikh: [phone]  Pt calling to report a fax number to send for her . Pt would need the claim number included when sending. Claim Number is [id number]. Please fax to [phone].

## 2019-09-17 NOTE — TELEPHONE ENCOUNTER
She did not leave any paper work with me . She asked about a work note. Please ask the scribe to complete the letter.  It`s in the chart but not completed,

## 2019-09-17 NOTE — TELEPHONE ENCOUNTER
Spoke with patient by phone on 09/16/2019 and she advises she didn't have and wasn't required to have FMLA paper work. States that she was just required to have LOV note faxed to the number provided. Paper work faxed, confirmation confirmed. End of encounter.

## 2019-09-19 ENCOUNTER — TELEPHONE (OUTPATIENT)
Dept: PRIMARY CARE CLINIC | Age: 29
End: 2019-09-19

## 2019-09-27 ENCOUNTER — TELEPHONE (OUTPATIENT)
Dept: PRIMARY CARE CLINIC | Age: 29
End: 2019-09-27

## 2019-10-01 ENCOUNTER — DOCUMENTATION ONLY (OUTPATIENT)
Dept: PRIMARY CARE CLINIC | Age: 29
End: 2019-10-01

## 2019-10-01 NOTE — PROGRESS NOTES
Spoke with physician at [facility] regarding her. Told him patient was referred to therapist & psychiatrist. She is on medications as well.

## 2019-10-07 ENCOUNTER — OFFICE VISIT (OUTPATIENT)
Dept: PRIMARY CARE CLINIC | Age: 29
End: 2019-10-07

## 2019-10-07 VITALS
HEIGHT: 67 IN | RESPIRATION RATE: 17 BRPM | BODY MASS INDEX: 33.56 KG/M2 | WEIGHT: 213.8 LBS | HEART RATE: 84 BPM | SYSTOLIC BLOOD PRESSURE: 108 MMHG | DIASTOLIC BLOOD PRESSURE: 69 MMHG | TEMPERATURE: 97.6 F | OXYGEN SATURATION: 98 %

## 2019-10-07 DIAGNOSIS — F43.10 PTSD (POST-TRAUMATIC STRESS DISORDER): ICD-10-CM

## 2019-10-07 DIAGNOSIS — J45.41 MODERATE PERSISTENT ASTHMA WITH ACUTE EXACERBATION: Primary | ICD-10-CM

## 2019-10-07 DIAGNOSIS — R05.9 COUGH: ICD-10-CM

## 2019-10-07 RX ORDER — METHYLPREDNISOLONE 4 MG/1
TABLET ORAL
Qty: 1 DOSE PACK | Refills: 0 | Status: SHIPPED | OUTPATIENT
Start: 2019-10-07 | End: 2020-07-22 | Stop reason: ALTCHOICE

## 2019-10-07 NOTE — PROGRESS NOTES
Written by Eliza Downs, as dictated by Dr. Jose Roberto Alvarez MD.    Deep Yao is a 30 y.o. female. HPI  The patient presents today c/o SOB and coughing. She reports having her good days and bad days, but more recently, she believes her SOB and coughing is due to the drastic changes in weather. She has been coughing at least 3 days out of the week, and will get coughing spells with the SOB episodes. As a result, she has been using her rescue inhaler at least, 3 or 4 times a day, which she believes is excessive. Compliant on Advair inhaler and Singular 10 mg. When she was talking to Dr. Rosio Castro, she was talking about her grieving and PTSD related to losing her mother. She admits having issues at times while at work, she will also experience PTSD such as hearing or experiencing certain things from her time as a . Her last month was on 09/11/19, and has an appointment later this week with Leal Cap. She was having some issues trying to find a psychiatrist, as many of the offices she contacted do not see patients unless they were previously admitted in the hospital. She was able to make an appointment with a psychiatrist for 10/30/19. She plans to go back to work on 10/10/19, as she feels she has been doing well on Celexa 10 mg. For the moment, she is trying to make sure her paperwork is on track, and in sync everywhere so that her insurance will cover the cost. She would like to continue on Celexa 10 mg for now, and see how it goes, before increasing the dosage.         Patient Active Problem List   Diagnosis Code   (none) - all problems resolved or deleted        Current Outpatient Medications on File Prior to Visit   Medication Sig Dispense Refill    citalopram (CELEXA) 10 mg tablet TAKE 1 TABLET BY MOUTH DAILY 30 Tab 1    fluticasone propion-salmeterol (ADVAIR/WIXELA) 250-50 mcg/dose diskus inhaler Take 1 Puff by inhalation every twelve (12) hours for 90 days. 60 Each 2    albuterol (PROVENTIL HFA, VENTOLIN HFA, PROAIR HFA) 90 mcg/actuation inhaler Take 1 Puff by inhalation every six (6) hours as needed for Wheezing for up to 90 days. 1 Inhaler 2    traZODone (DESYREL) 100 mg tablet Take 1 Tab by mouth nightly for 90 days. 30 Tab 2    fexofenadine-pseudoephedrine (ALLEGRA-D 24 HOUR) 180-240 mg per tablet Take 1 Tab by mouth daily.  montelukast (SINGULAIR) 10 mg tablet Take 10 mg by mouth daily.  traZODone (DESYREL) 50 mg tablet Take 50 mg by mouth. No current facility-administered medications on file prior to visit. Allergies   Allergen Reactions    Aspirin Nausea and Vomiting    Peanut Anaphylaxis     And tree nuts    Penicillins Rash    Percocet [Oxycodone-Acetaminophen] Itching    Sulfa (Sulfonamide Antibiotics) Other (comments)     States it was a childhood reaction does not remember       Past Medical History:   Diagnosis Date    Asthma        Family History   Problem Relation Age of Onset    Cancer Mother     Hypertension Father     Diabetes Father     Diabetes Brother     Asthma Maternal Grandmother     No Known Problems Paternal Grandmother     Hypertension Brother        Social History     Socioeconomic History    Marital status:      Spouse name: Not on file    Number of children: Not on file    Years of education: Not on file    Highest education level: Not on file   Occupational History    Not on file   Social Needs    Financial resource strain: Not on file    Food insecurity:     Worry: Not on file     Inability: Not on file    Transportation needs:     Medical: Not on file     Non-medical: Not on file   Tobacco Use    Smoking status: Never Smoker    Smokeless tobacco: Never Used   Substance and Sexual Activity    Alcohol use:  Yes     Alcohol/week: 2.0 standard drinks     Types: 1 Glasses of wine, 1 Shots of liquor per week    Drug use: Yes     Types: Marijuana     Comment: Recreational/Insomnia  Sexual activity: Yes     Partners: Female     Birth control/protection: None   Lifestyle    Physical activity:     Days per week: Not on file     Minutes per session: Not on file    Stress: Not on file   Relationships    Social connections:     Talks on phone: Not on file     Gets together: Not on file     Attends Mormonism service: Not on file     Active member of club or organization: Not on file     Attends meetings of clubs or organizations: Not on file     Relationship status: Not on file    Intimate partner violence:     Fear of current or ex partner: Not on file     Emotionally abused: Not on file     Physically abused: Not on file     Forced sexual activity: Not on file   Other Topics Concern    Not on file   Social History Narrative    Not on file       Review of Systems   Respiratory: Positive for cough and shortness of breath. Musculoskeletal: Negative for joint pain and myalgias. Neurological: Negative for dizziness and headaches. Endo/Heme/Allergies: Positive for environmental allergies. Psychiatric/Behavioral: Positive for depression (improving). Negative for substance abuse. The patient is not nervous/anxious and does not have insomnia. Visit Vitals  /69 (BP 1 Location: Right arm, BP Patient Position: Sitting)   Pulse 84   Temp 97.6 °F (36.4 °C) (Oral)   Resp 17   Ht 5' 7\" (1.702 m)   Wt 213 lb 12.8 oz (97 kg)   SpO2 98%   BMI 33.49 kg/m²       Physical Exam   Constitutional: She is oriented to person, place, and time. She appears well-developed. No distress. obese   HENT:   Head: Normocephalic and atraumatic. Right Ear: External ear normal.   Left Ear: External ear normal.   Eyes: Pupils are equal, round, and reactive to light. Conjunctivae and EOM are normal. Right eye exhibits no discharge. Left eye exhibits no discharge. Neck: Normal range of motion. Neck supple. Cardiovascular: Normal rate, regular rhythm and normal heart sounds.  Exam reveals no gallop and no friction rub. No murmur heard. Pulmonary/Chest: Effort normal and breath sounds normal. She has no wheezes. Abdominal: Soft. Bowel sounds are normal. There is no tenderness. Musculoskeletal: Normal range of motion. Neurological: She is alert and oriented to person, place, and time. She has normal reflexes. Skin: She is not diaphoretic. Psychiatric: She has a normal mood and affect. Her behavior is normal. Thought content normal.   Nursing note and vitals reviewed. ASSESSMENT and PLAN    ICD-10-CM ICD-9-CM    1. Moderate persistent asthma with acute exacerbation J45.41 493.92 methylPREDNISolone (MEDROL DOSEPACK) 4 mg tablet sent to pharmacy. Medrol DosePack 4 mg prescribed. Potential side effects were discussed. Pt should take as prescribed on packaging. 2. Cough R05 786.2 Should improve with resolution of asthma exacerbation. 3. PTSD (post-traumatic stress disorder) F43.10 309.81 Followed by Psychology. Pt has an appointment with Psychiatrist on 10/30/19. Pt will try to go back to work on 10/10/19 and see how she does while on Celexa 10 mg. This plan was reviewed with the patient and patient agrees. All questions were answered. This scribe documentation was reviewed by me and accurately reflects the examination and decisions made by me. This note will not be viewable in [address]

## 2019-11-03 ENCOUNTER — HOSPITAL ENCOUNTER (EMERGENCY)
Age: 29
Discharge: HOME | End: 2019-11-03
Attending: STUDENT IN AN ORGANIZED HEALTH CARE EDUCATION/TRAINING PROGRAM
Payer: COMMERCIAL

## 2019-11-03 ENCOUNTER — APPOINTMENT (OUTPATIENT)
Dept: CT IMAGING | Age: 29
End: 2019-11-03
Attending: STUDENT IN AN ORGANIZED HEALTH CARE EDUCATION/TRAINING PROGRAM
Payer: COMMERCIAL

## 2019-11-03 VITALS
WEIGHT: 214.95 LBS | BODY MASS INDEX: 33.74 KG/M2 | SYSTOLIC BLOOD PRESSURE: 133 MMHG | DIASTOLIC BLOOD PRESSURE: 74 MMHG | TEMPERATURE: 98.1 F | OXYGEN SATURATION: 100 % | RESPIRATION RATE: 15 BRPM | HEART RATE: 91 BPM | HEIGHT: 67 IN

## 2019-11-03 DIAGNOSIS — D21.9 FIBROID: ICD-10-CM

## 2019-11-03 DIAGNOSIS — R10.9 ACUTE LEFT FLANK PAIN: Primary | ICD-10-CM

## 2019-11-03 LAB
APPEARANCE UR: CLEAR
BACTERIA URNS QL MICRO: ABNORMAL /HPF
BILIRUB UR QL: NEGATIVE
COLOR UR: ABNORMAL
DEPRECATED S PYO AG THROAT QL EIA: NEGATIVE
EPITH CASTS URNS QL MICRO: ABNORMAL /LPF
GLUCOSE UR STRIP.AUTO-MCNC: NEGATIVE MG/DL
HCG UR QL: NEGATIVE
HGB UR QL STRIP: NEGATIVE
KETONES UR QL STRIP.AUTO: ABNORMAL MG/DL
LEUKOCYTE ESTERASE UR QL STRIP.AUTO: ABNORMAL
MUCOUS THREADS URNS QL MICRO: ABNORMAL /LPF
NITRITE UR QL STRIP.AUTO: NEGATIVE
PH UR STRIP: 7 [PH] (ref 5–8)
PROT UR STRIP-MCNC: NEGATIVE MG/DL
RBC #/AREA URNS HPF: ABNORMAL /HPF (ref 0–5)
SP GR UR REFRACTOMETRY: 1.02 (ref 1–1.03)
UR CULT HOLD, URHOLD: NORMAL
UROBILINOGEN UR QL STRIP.AUTO: 0.2 EU/DL (ref 0.2–1)
WBC URNS QL MICRO: ABNORMAL /HPF (ref 0–4)

## 2019-11-03 PROCEDURE — 99284 EMERGENCY DEPT VISIT MOD MDM: CPT

## 2019-11-03 PROCEDURE — 87070 CULTURE OTHR SPECIMN AEROBIC: CPT

## 2019-11-03 PROCEDURE — 81001 URINALYSIS AUTO W/SCOPE: CPT

## 2019-11-03 PROCEDURE — 81025 URINE PREGNANCY TEST: CPT

## 2019-11-03 PROCEDURE — 74011250636 HC RX REV CODE- 250/636: Performed by: STUDENT IN AN ORGANIZED HEALTH CARE EDUCATION/TRAINING PROGRAM

## 2019-11-03 PROCEDURE — 96372 THER/PROPH/DIAG INJ SC/IM: CPT

## 2019-11-03 PROCEDURE — 74176 CT ABD & PELVIS W/O CONTRAST: CPT

## 2019-11-03 PROCEDURE — 87880 STREP A ASSAY W/OPTIC: CPT

## 2019-11-03 RX ORDER — GLUCOSAMINE/CHONDR SU A SOD 750-600 MG
TABLET ORAL
COMMUNITY

## 2019-11-03 RX ORDER — KETOROLAC TROMETHAMINE 30 MG/ML
30 INJECTION, SOLUTION INTRAMUSCULAR; INTRAVENOUS ONCE
Status: COMPLETED | OUTPATIENT
Start: 2019-11-03 | End: 2019-11-03

## 2019-11-03 RX ORDER — VITAMIN E CAP 100 UNIT 100 UNIT
CAP ORAL DAILY
COMMUNITY

## 2019-11-03 RX ORDER — CEPHALEXIN 500 MG/1
500 CAPSULE ORAL 3 TIMES DAILY
Qty: 21 CAP | Refills: 0 | Status: SHIPPED | OUTPATIENT
Start: 2019-11-03 | End: 2019-11-10

## 2019-11-03 RX ORDER — ERGOCALCIFEROL 1.25 MG/1
50000 CAPSULE ORAL
COMMUNITY

## 2019-11-03 RX ORDER — VITAMIN E 1000 UNIT
1000 CAPSULE ORAL
COMMUNITY

## 2019-11-03 RX ORDER — NAPROXEN 500 MG/1
500 TABLET ORAL 2 TIMES DAILY WITH MEALS
Qty: 10 TAB | Refills: 0 | Status: SHIPPED | OUTPATIENT
Start: 2019-11-03 | End: 2019-11-08

## 2019-11-03 RX ADMIN — KETOROLAC TROMETHAMINE 30 MG: 30 INJECTION, SOLUTION INTRAMUSCULAR at 11:50

## 2019-11-03 NOTE — ED NOTES
Patient was discharged and given instructions by Dr. Lucero Alcantara. Patient verbalized good understanding of all discharge instructions, prescriptions and f/u care. All questions answered. Pt in stable condition on discharge.

## 2019-11-03 NOTE — ED PROVIDER NOTES
Patient is a 68-year-old female with a past medical history of asthma who presents ambulatory to the [facility] emergency center with a chief complaint of left flank pain. She states she noticed it yesterday evening and it was mild and dull. Throughout the night gradually became more severe. Now 10 out of 10 pain in the left flank. It does not radiate. It is associated with increased frequency of urination but she denies dysuria or hematuria. Nothing makes the pain worse. Holding pressure on it makes it slightly better. She has not taken anything for the pain prior to arrival.  No history of kidney stones or pyelonephritis. No traumatic injuries recently. Patient denies rash, fever, vomiting, diarrhea. She has noted recent sore throat, recent asthma exacerbation that was treated with prednisone, and denies chance of pregnancy. No past medical history on file. No past surgical history on file. No family history on file.     Social History     Socioeconomic History    Marital status: Not on file     Spouse name: Not on file    Number of children: Not on file    Years of education: Not on file    Highest education level: Not on file   Occupational History    Not on file   Social Needs    Financial resource strain: Not on file    Food insecurity:     Worry: Not on file     Inability: Not on file    Transportation needs:     Medical: Not on file     Non-medical: Not on file   Tobacco Use    Smoking status: Not on file   Substance and Sexual Activity    Alcohol use: Not on file    Drug use: Not on file    Sexual activity: Not on file   Lifestyle    Physical activity:     Days per week: Not on file     Minutes per session: Not on file    Stress: Not on file   Relationships    Social connections:     Talks on phone: Not on file     Gets together: Not on file     Attends Restoration service: Not on file     Active member of club or organization: Not on file     Attends meetings of clubs or organizations: Not on file     Relationship status: Not on file    Intimate partner violence:     Fear of current or ex partner: Not on file     Emotionally abused: Not on file     Physically abused: Not on file     Forced sexual activity: Not on file   Other Topics Concern    Not on file   Social History Narrative    Not on file         ALLERGIES: Nut - unspecified; Aspirin; Pcn [penicillins]; and Percocet [oxycodone-acetaminophen]    Review of Systems   Constitutional: Negative for chills and fever. HENT: Positive for sore throat. Respiratory: Positive for cough. Negative for shortness of breath. Cardiovascular: Negative for chest pain. Gastrointestinal: Positive for abdominal pain. Negative for vomiting. Genitourinary: Positive for frequency. Negative for dysuria, hematuria and vaginal bleeding. Musculoskeletal: Positive for back pain (L flank). Skin: Negative for rash. Neurological: Negative for syncope and headaches. All other systems reviewed and are negative. There were no vitals filed for this visit. Physical Exam   Constitutional: She is oriented to person, place, and time. She appears well-developed. No distress. HENT:   Head: Normocephalic and atraumatic. Eyes: Conjunctivae and EOM are normal.   Neck: Normal range of motion. Neck supple. Cardiovascular: Normal rate, regular rhythm and normal heart sounds. Pulmonary/Chest: Effort normal and breath sounds normal. No respiratory distress. Abdominal: Soft. There is tenderness (mild L CVAT). There is no guarding. Musculoskeletal: Normal range of motion. She exhibits no edema. Neurological: She is alert and oriented to person, place, and time. She exhibits normal muscle tone. Skin: Skin is warm and dry. MDM       Procedures    12:48 PM  The patient is resting comfortably and feels better, is alert and in no distress.  The repeat examination is unremarkable and benign; in particular, there is no discomfort at McBurney's point. The history, exam, diagnostic testing, and current condition do not suggest acute appendicitis, bowel obstruction, tubovarian abscess, ectopic pregnancy, ovarian torsion, acute cholecystitis, bowel perforation, major gastrointestinal bleeding, severe diverticulitis, sepsis, or other significant pathology to warrant further testing, continued ED treatment, admission, or surgical evaluation at this point. The vital signs have been stable. The patient does not have uncontrollable pain, intractable vomiting, or other significant symptoms. The patient's condition is stable and appropriate for discharge. The patient will pursue further outpatient evaluation with the primary care physician or other designated or consulting physician as indicated in the discharge instructions.

## 2019-11-03 NOTE — ED TRIAGE NOTES
Pt. Complains of left sided flank pain that started last night with increased urination. No hx. Of kidney stone.

## 2019-11-05 LAB
BACTERIA SPEC CULT: NORMAL
SERVICE CMNT-IMP: NORMAL

## 2019-11-12 DIAGNOSIS — J45.40 MODERATE PERSISTENT ASTHMA WITHOUT COMPLICATION: ICD-10-CM

## 2019-11-12 DIAGNOSIS — F41.9 ANXIETY AND DEPRESSION: ICD-10-CM

## 2019-11-12 DIAGNOSIS — F51.01 PRIMARY INSOMNIA: ICD-10-CM

## 2019-11-12 DIAGNOSIS — F32.A ANXIETY AND DEPRESSION: ICD-10-CM

## 2019-11-12 RX ORDER — TRAZODONE HYDROCHLORIDE 100 MG/1
TABLET ORAL
Qty: 30 TAB | Refills: 0 | Status: SHIPPED | OUTPATIENT
Start: 2019-11-12 | End: 2019-12-18 | Stop reason: SDUPTHER

## 2019-11-12 RX ORDER — CITALOPRAM 10 MG/1
TABLET ORAL
Qty: 30 TAB | Refills: 0 | Status: SHIPPED | OUTPATIENT
Start: 2019-11-12 | End: 2020-07-22 | Stop reason: ALTCHOICE

## 2019-11-12 RX ORDER — ALBUTEROL SULFATE 90 UG/1
AEROSOL, METERED RESPIRATORY (INHALATION)
Qty: 18 G | Refills: 0 | Status: SHIPPED | OUTPATIENT
Start: 2019-11-12 | End: 2019-12-17 | Stop reason: SDUPTHER

## 2019-11-15 ENCOUNTER — OFFICE VISIT (OUTPATIENT)
Dept: PRIMARY CARE CLINIC | Age: 29
End: 2019-11-15

## 2019-11-15 VITALS
OXYGEN SATURATION: 99 % | HEIGHT: 67 IN | TEMPERATURE: 98.1 F | DIASTOLIC BLOOD PRESSURE: 81 MMHG | RESPIRATION RATE: 18 BRPM | BODY MASS INDEX: 32.93 KG/M2 | HEART RATE: 76 BPM | WEIGHT: 209.8 LBS | SYSTOLIC BLOOD PRESSURE: 136 MMHG

## 2019-11-15 DIAGNOSIS — D25.1 INTRAMURAL AND SUBMUCOUS LEIOMYOMA OF UTERUS: ICD-10-CM

## 2019-11-15 DIAGNOSIS — R10.9 FLANK PAIN: ICD-10-CM

## 2019-11-15 DIAGNOSIS — D25.0 INTRAMURAL AND SUBMUCOUS LEIOMYOMA OF UTERUS: ICD-10-CM

## 2019-11-15 DIAGNOSIS — R10.30 LOWER ABDOMINAL PAIN: Primary | ICD-10-CM

## 2019-11-15 DIAGNOSIS — N20.0 RENAL CALCULI: ICD-10-CM

## 2019-11-15 LAB
BILIRUB UR QL STRIP: NORMAL
GLUCOSE UR-MCNC: NEGATIVE MG/DL
KETONES P FAST UR STRIP-MCNC: NEGATIVE MG/DL
PH UR STRIP: 7.5 [PH] (ref 4.6–8)
PROT UR QL STRIP: NORMAL
SP GR UR STRIP: 1.02 (ref 1–1.03)
UA UROBILINOGEN AMB POC: NORMAL (ref 0.2–1)
URINALYSIS CLARITY POC: NORMAL
URINALYSIS COLOR POC: NORMAL
URINE BLOOD POC: NORMAL
URINE LEUKOCYTES POC: NORMAL
URINE NITRITES POC: NEGATIVE

## 2019-11-15 RX ORDER — TAMSULOSIN HYDROCHLORIDE 0.4 MG/1
0.4 CAPSULE ORAL DAILY
Qty: 10 CAP | Refills: 0 | Status: SHIPPED | OUTPATIENT
Start: 2019-11-15 | End: 2019-11-25

## 2019-11-15 NOTE — PROGRESS NOTES
Room 4    Identified pt with two pt identifiers(name and ). Reviewed record in preparation for visit and have obtained necessary documentation. All patient medications has been reviewed. Chief Complaint   Patient presents with    Transitions Of Care     19. dx: flank pain and sore throat       Health Maintenance Due   Topic    Pneumococcal 0-64 years (1 of 1 - PPSV23)    DTaP/Tdap/Td series (1 - Tdap)    PAP AKA CERVICAL CYTOLOGY     Influenza Age 5 to Adult        Vitals:    11/15/19 0913   BP: 136/81   Pulse: 76   Resp: 18   Temp: 98.1 °F (36.7 °C)   TempSrc: Oral   SpO2: 99%   Weight: 209 lb 12.8 oz (95.2 kg)   Height: 5' 7\" (1.702 m)   PainSc:   7   PainLoc: Groin   LMP: 2019       Wt Readings from Last 3 Encounters:   11/15/19 209 lb 12.8 oz (95.2 kg)   19 214 lb 15.2 oz (97.5 kg)   10/07/19 213 lb 12.8 oz (97 kg)     Temp Readings from Last 3 Encounters:   11/15/19 98.1 °F (36.7 °C) (Oral)   19 98.1 °F (36.7 °C)   10/07/19 97.6 °F (36.4 °C) (Oral)     BP Readings from Last 3 Encounters:   11/15/19 136/81   19 133/74   10/07/19 108/69     Pulse Readings from Last 3 Encounters:   11/15/19 76   19 91   10/07/19 84       No results found for: HBA1C, HGBE8, HVC8OXOD, GWS8LZCE, TNL5SXUN    Coordination of Care Questionnaire:   1) Have you been to an emergency room, urgent care, or hospitalized since your last visit? yes     19. dx: flank pain and sore throat  2. Have seen or consulted any other health care provider since your last visit? NO  19 [address] Dx: pelvic exam.  3) Do you have an Advanced Directive/ Living Will in place? NO  If yes, do we have a copy on file NO  If no, would you like information NO    Patient is accompanied by self I have received verbal consent from Hannah Reyes to discuss any/all medical information while they are present in the room.

## 2019-11-15 NOTE — PROGRESS NOTES
Written by Ayad Valero, as dictated by Dr. Marilu Simmonds, MD.    Zainab Koo is a 30 y.o. female. HPI  The patient presents today for hospital follow-up. She was seen at [facility] on 11/03/19 c/o excrutiating L flank pain. UA showed evidence of a UTI. CT-scan showed \"Nonobstructing right intrarenal stone. No hydronephrosis. Trace free fluid in the pelvis, nonspecific. Probable fibroids. \" She was treated with Toradol injection for the pain and discharged with Naproxen 500 mg and Keflex 500 mg TID x 7 days. She was advised to follow with her PCP. She still has pressure and cramping in her R side, and is urinating a little bit at a time, and has urinary urgency. She is currently on her period at this time. Denies dysuria. She now reports having pain in her bladder and vaginal area. She follow-up with her OB/GYN, who performed an US, which showed 5 fibroids, and cysts on her ovaries. She will be seeing a fertility specialist on Monday, 11/18/19, and states she would like to have them removed. She has F hx of fibroids who were treated with medication, but they all became cancerous and then had to have surgery. She also had a sore throat at the time of her ED visit, but the pain improved with the antibiotics she was prescribed, and has not had pain since.       Patient Active Problem List   Diagnosis Code   (none) - all problems resolved or deleted        Current Outpatient Medications on File Prior to Visit   Medication Sig Dispense Refill    albuterol (PROVENTIL HFA, VENTOLIN HFA, PROAIR HFA) 90 mcg/actuation inhaler INHALE 1 PUFF BY MOUTH EVERY 6 HOURS AS NEEDED FOR WHEEZING 18 g 0    traZODone (DESYREL) 100 mg tablet TAKE 1 TABLET BY MOUTH EVERY NIGHT 30 Tab 0    citalopram (CELEXA) 10 mg tablet TAKE 1 TABLET BY MOUTH DAILY 30 Tab 0    fluticasone propion-salmeterol (ADVAIR/WIXELA) 250-50 mcg/dose diskus inhaler Take 1 Puff by inhalation every twelve (12) hours for 90 days. 60 Each 2    fexofenadine-pseudoephedrine (ALLEGRA-D 24 HOUR) 180-240 mg per tablet Take 1 Tab by mouth daily.  montelukast (SINGULAIR) 10 mg tablet Take 10 mg by mouth daily.  ascorbic acid, vitamin C, (VITAMIN C) 1,000 mg tablet Take 1,000 mg by mouth.  ergocalciferol (VITAMIN D2) 50,000 unit capsule Take 50,000 Units by mouth.  Biotin 2,500 mcg cap Take  by mouth.  vitamin e (E GEMS) 100 unit capsule Take  by mouth daily.  zinc oxide-cod liver oil (DESITIN) 40 % ointment Apply  to affected area as needed for Skin Irritation.  folic acid-vit T6-KTQ R41 2.2-25-1 mg tab Take  by mouth.  methylPREDNISolone (MEDROL DOSEPACK) 4 mg tablet As directed. (Patient not taking: Reported on 11/15/2019) 1 Dose Pack 0    traZODone (DESYREL) 50 mg tablet Take 50 mg by mouth. No current facility-administered medications on file prior to visit.         Allergies   Allergen Reactions    Aspirin Nausea and Vomiting    Nut - Unspecified Anaphylaxis    Peanut Anaphylaxis     And tree nuts    Penicillins Rash    Percocet [Oxycodone-Acetaminophen] Itching    Aspirin Other (comments)     Muscle spasms    Pcn [Penicillins] Unknown (comments)    Percocet [Oxycodone-Acetaminophen] Rash    Sulfa (Sulfonamide Antibiotics) Other (comments)     States it was a childhood reaction does not remember       Past Medical History:   Diagnosis Date    Arthritis     Asthma     Psychiatric disorder     depression and anxiety       Family History   Problem Relation Age of Onset    Cancer Mother     Hypertension Father     Diabetes Father     Diabetes Brother     Asthma Maternal Grandmother     No Known Problems Paternal Grandmother     Hypertension Brother        Social History     Socioeconomic History    Marital status:      Spouse name: Not on file    Number of children: Not on file    Years of education: Not on file    Highest education level: Not on file   Occupational History    Not on file   Social Needs    Financial resource strain: Not on file    Food insecurity:     Worry: Not on file     Inability: Not on file    Transportation needs:     Medical: Not on file     Non-medical: Not on file   Tobacco Use    Smoking status: Never Smoker    Smokeless tobacco: Never Used   Substance and Sexual Activity    Alcohol use: Yes     Comment: occ/socially-    Drug use: Yes     Types: Marijuana     Comment: Recreational/Insomnia     Sexual activity: Yes     Partners: Female     Birth control/protection: None   Lifestyle    Physical activity:     Days per week: Not on file     Minutes per session: Not on file    Stress: Not on file   Relationships    Social connections:     Talks on phone: Not on file     Gets together: Not on file     Attends Druze service: Not on file     Active member of club or organization: Not on file     Attends meetings of clubs or organizations: Not on file     Relationship status: Not on file    Intimate partner violence:     Fear of current or ex partner: Not on file     Emotionally abused: Not on file     Physically abused: Not on file     Forced sexual activity: Not on file   Other Topics Concern    Not on file   Social History Narrative    ** Merged History Encounter **            Admission on 11/03/2019, Discharged on 11/03/2019   Component Date Value Ref Range Status    Color 11/03/2019 YELLOW/STRAW    Final    Appearance 11/03/2019 CLEAR  CLEAR   Final    Specific gravity 11/03/2019 1.020  1.003 - 1.030   Final    pH (UA) 11/03/2019 7.0  5.0 - 8.0   Final    Protein 11/03/2019 NEGATIVE   NEG mg/dL Final    Glucose 11/03/2019 NEGATIVE   NEG mg/dL Final    Ketone 11/03/2019 TRACE* NEG mg/dL Final    Bilirubin 11/03/2019 NEGATIVE   NEG   Final    Blood 11/03/2019 NEGATIVE   NEG   Final    Urobilinogen 11/03/2019 0.2  0.2 - 1.0 EU/dL Final    Nitrites 11/03/2019 NEGATIVE   NEG   Final    Leukocyte Esterase 11/03/2019 MODERATE* NEG Final    WBC 11/03/2019 5-10  0 - 4 /hpf Final    RBC 11/03/2019 0-5  0 - 5 /hpf Final    Epithelial cells 11/03/2019 FEW  FEW /lpf Final    Bacteria 11/03/2019 1+* NEG /hpf Final    Mucus 11/03/2019 2+* NEG /lpf Final    Pregnancy test,urine (POC) 11/03/2019 NEGATIVE   NEG   Final    Group A Strep Ag ID 11/03/2019 NEGATIVE   NEG   Final    Special Requests: 11/03/2019 NO SPECIAL REQUESTS    Final    Culture result: 11/03/2019 NORMAL RESPIRATORY ZEE/NO BETA STREP ISOLATED    Final       Review of Systems   HENT: Positive for sore throat (resolved). Negative for congestion and hearing loss. Respiratory: Negative for cough and shortness of breath. Gastrointestinal: Positive for abdominal pain (R side, suprapubic). Negative for constipation, diarrhea and heartburn. Genitourinary: Positive for flank pain (resolved), frequency and urgency. Negative for dysuria. Musculoskeletal: Negative for joint pain and myalgias. Neurological: Negative for dizziness and headaches. Psychiatric/Behavioral: Negative for depression and substance abuse. The patient is not nervous/anxious and does not have insomnia. Visit Vitals  /81 (BP 1 Location: Left arm, BP Patient Position: Sitting)   Pulse 76   Temp 98.1 °F (36.7 °C) (Oral)   Resp 18   Ht 5' 7\" (1.702 m)   Wt 209 lb 12.8 oz (95.2 kg)   LMP 11/14/2019   SpO2 99%   Breastfeeding? Unknown   BMI 32.86 kg/m²       Physical Exam   Constitutional: She is oriented to person, place, and time. She appears well-developed. No distress. obese   HENT:   Head: Normocephalic and atraumatic. Right Ear: External ear normal.   Left Ear: External ear normal.   Eyes: Pupils are equal, round, and reactive to light. Conjunctivae and EOM are normal. Right eye exhibits no discharge. Left eye exhibits no discharge. Neck: Normal range of motion. Neck supple. Cardiovascular: Normal rate, regular rhythm and normal heart sounds.  Exam reveals no gallop and no friction rub.   No murmur heard. Pulmonary/Chest: Effort normal and breath sounds normal. She has no wheezes. Abdominal: Soft. Bowel sounds are normal. There is tenderness in the right lower quadrant, suprapubic area and left lower quadrant. Musculoskeletal: Normal range of motion. Neurological: She is alert and oriented to person, place, and time. She has normal reflexes. Skin: She is not diaphoretic. Psychiatric: She has a normal mood and affect. Her behavior is normal. Thought content normal.   Nursing note and vitals reviewed. ASSESSMENT and PLAN    ICD-10-CM ICD-9-CM    1. Lower abdominal pain R10.30 789.09 No treatment at this time. Patient is managing well with  Ibuprofen. Discussed fibroids may be causing the pressure and pain is related to kidney stones. Should improve once fibroids are removed and kidney stone is passed. 2. Intramural and submucous leiomyoma of uterus D25.1 218.0 Followed by OB/GYN.    D25.0 218.1    3. Flank pain R10.9 789.09 AMB POC URINALYSIS DIP STICK MANUAL W/O MICRO    POC UA showed Protein 2+, Blood 3+, Bilirubin 1 +, and Leukocyte Trace. Patient is currently on her period. 4. Renal calculi N20.0 592.0 tamsulosin (FLOMAX) 0.4 mg capsule sent to pharmacy. Flomax 0.4 mg prescribed. Potential side effects were discussed. Pt should take 1 tab daily x 10 days. Discussed Flomax should helped open ureters to help pass the kidney stone. This plan was reviewed with the patient and patient agrees. All questions were answered. This scribe documentation was reviewed by me and accurately reflects the examination and decisions made by me. This note will not be viewable in [address]

## 2019-11-15 NOTE — LETTER
NOTIFICATION RETURN TO WORK / SCHOOL 
 
11/15/2019 9:33 AM 
 
Ms. Bev Gonzalez [address] [address] To Whom It May Concern: 
 
Bev Gonzalez is currently under the care of Samy Ojeda. Patient was seen today for emergency room follow-up and is still in pain. Medication was prescribed. Bed rest recommended. She will return to work/school on: 11/18/19 If there are questions or concerns please contact our office. Sincerely, Drew Kent MD

## 2019-12-17 DIAGNOSIS — J45.40 MODERATE PERSISTENT ASTHMA WITHOUT COMPLICATION: ICD-10-CM

## 2019-12-17 RX ORDER — ALBUTEROL SULFATE 90 UG/1
AEROSOL, METERED RESPIRATORY (INHALATION)
Qty: 18 G | Refills: 0 | Status: SHIPPED | OUTPATIENT
Start: 2019-12-17 | End: 2020-01-22 | Stop reason: SDUPTHER

## 2019-12-18 DIAGNOSIS — F51.01 PRIMARY INSOMNIA: ICD-10-CM

## 2019-12-18 RX ORDER — TRAZODONE HYDROCHLORIDE 100 MG/1
100 TABLET ORAL
Qty: 30 TAB | Refills: 2 | Status: SHIPPED | OUTPATIENT
Start: 2019-12-18 | End: 2020-01-16 | Stop reason: SDUPTHER

## 2020-01-15 DIAGNOSIS — J45.40 MODERATE PERSISTENT ASTHMA WITHOUT COMPLICATION: ICD-10-CM

## 2020-01-15 DIAGNOSIS — R07.89 CHEST TIGHTNESS: ICD-10-CM

## 2020-01-15 RX ORDER — FLUTICASONE PROPIONATE AND SALMETEROL 250; 50 UG/1; UG/1
POWDER RESPIRATORY (INHALATION)
Qty: 2 INHALER | Refills: 0 | Status: SHIPPED | OUTPATIENT
Start: 2020-01-15 | End: 2020-01-22 | Stop reason: SDUPTHER

## 2020-01-16 DIAGNOSIS — F51.01 PRIMARY INSOMNIA: ICD-10-CM

## 2020-01-16 RX ORDER — TRAZODONE HYDROCHLORIDE 100 MG/1
100 TABLET ORAL
Qty: 30 TAB | Refills: 2 | Status: SHIPPED | OUTPATIENT
Start: 2020-01-16 | End: 2020-04-15

## 2020-01-16 NOTE — TELEPHONE ENCOUNTER
LOV 11/15/19    Last ordered 12/18/2019    90 day requested    Also ALTERNATIVE requested for Doris Loaiza [address]

## 2020-01-22 DIAGNOSIS — J45.40 MODERATE PERSISTENT ASTHMA WITHOUT COMPLICATION: ICD-10-CM

## 2020-01-22 DIAGNOSIS — R07.89 CHEST TIGHTNESS: ICD-10-CM

## 2020-01-22 RX ORDER — MONTELUKAST SODIUM 10 MG/1
10 TABLET ORAL DAILY
Qty: 90 TAB | Refills: 0 | Status: SHIPPED | OUTPATIENT
Start: 2020-01-22 | End: 2020-03-19 | Stop reason: SDUPTHER

## 2020-01-22 RX ORDER — ALBUTEROL SULFATE 90 UG/1
AEROSOL, METERED RESPIRATORY (INHALATION)
Qty: 18 G | Refills: 0 | Status: SHIPPED | OUTPATIENT
Start: 2020-01-22 | End: 2020-03-19 | Stop reason: SDUPTHER

## 2020-01-22 RX ORDER — FLUTICASONE PROPIONATE AND SALMETEROL 250; 50 UG/1; UG/1
POWDER RESPIRATORY (INHALATION)
Qty: 2 INHALER | Refills: 1 | Status: SHIPPED | OUTPATIENT
Start: 2020-01-22 | End: 2020-01-23 | Stop reason: SDUPTHER

## 2020-01-22 NOTE — TELEPHONE ENCOUNTER
Patients wife came in and stated patient needs all meds refilled. Needs 90 day supplies.  Advair, singular, albuterol,

## 2020-01-23 DIAGNOSIS — R07.89 CHEST TIGHTNESS: ICD-10-CM

## 2020-01-23 DIAGNOSIS — J45.40 MODERATE PERSISTENT ASTHMA WITHOUT COMPLICATION: ICD-10-CM

## 2020-01-23 RX ORDER — FLUTICASONE PROPIONATE AND SALMETEROL 250; 50 UG/1; UG/1
POWDER RESPIRATORY (INHALATION)
Qty: 3 INHALER | Refills: 1 | Status: SHIPPED | OUTPATIENT
Start: 2020-01-23 | End: 2020-03-19 | Stop reason: SDUPTHER

## 2020-03-19 ENCOUNTER — TELEPHONE (OUTPATIENT)
Dept: PRIMARY CARE CLINIC | Age: 30
End: 2020-03-19

## 2020-03-19 DIAGNOSIS — J30.9 ALLERGIC RHINITIS, UNSPECIFIED SEASONALITY, UNSPECIFIED TRIGGER: Primary | ICD-10-CM

## 2020-03-19 DIAGNOSIS — J45.40 MODERATE PERSISTENT ASTHMA WITHOUT COMPLICATION: ICD-10-CM

## 2020-03-19 DIAGNOSIS — R07.89 CHEST TIGHTNESS: ICD-10-CM

## 2020-03-19 RX ORDER — MONTELUKAST SODIUM 10 MG/1
10 TABLET ORAL DAILY
Qty: 90 TAB | Refills: 0 | Status: SHIPPED | OUTPATIENT
Start: 2020-03-19 | End: 2020-06-24

## 2020-03-19 RX ORDER — FLUTICASONE PROPIONATE AND SALMETEROL 250; 50 UG/1; UG/1
POWDER RESPIRATORY (INHALATION)
Qty: 3 INHALER | Refills: 1 | Status: SHIPPED | OUTPATIENT
Start: 2020-03-19 | End: 2020-09-19

## 2020-03-19 RX ORDER — ALBUTEROL SULFATE 90 UG/1
AEROSOL, METERED RESPIRATORY (INHALATION)
Qty: 18 G | Refills: 0 | Status: SHIPPED | OUTPATIENT
Start: 2020-03-19 | End: 2020-04-02

## 2020-03-19 RX ORDER — FEXOFENADINE HYDROCHLORIDE AND PSEUDOEPHEDRINE HYDROCHLORIDE 180; 240 MG/1; MG/1
1 TABLET, FILM COATED, EXTENDED RELEASE ORAL DAILY
Qty: 90 TAB | Refills: 0 | Status: SHIPPED | OUTPATIENT
Start: 2020-03-19 | End: 2020-06-17

## 2020-03-19 NOTE — TELEPHONE ENCOUNTER
Last office visit 11/15/2019  Last med refill  Fexofenadine historical provider  Fluticasone 1/23/2020  Montelukast 1/22/2020  Albuterol 1/22/2020

## 2020-04-02 DIAGNOSIS — J45.40 MODERATE PERSISTENT ASTHMA WITHOUT COMPLICATION: ICD-10-CM

## 2020-04-02 RX ORDER — ALBUTEROL SULFATE 90 UG/1
AEROSOL, METERED RESPIRATORY (INHALATION)
Qty: 18 INHALER | Refills: 0 | Status: SHIPPED | OUTPATIENT
Start: 2020-04-02 | End: 2020-04-28

## 2020-04-16 ENCOUNTER — TELEPHONE (OUTPATIENT)
Dept: PRIMARY CARE CLINIC | Age: 30
End: 2020-04-16

## 2020-04-17 ENCOUNTER — VIRTUAL VISIT (OUTPATIENT)
Dept: PRIMARY CARE CLINIC | Age: 30
End: 2020-04-17

## 2020-04-17 DIAGNOSIS — R11.0 NAUSEA: ICD-10-CM

## 2020-04-17 DIAGNOSIS — Z98.890 S/P MYOMECTOMY: ICD-10-CM

## 2020-04-17 DIAGNOSIS — J45.40 MODERATE PERSISTENT ASTHMA WITHOUT COMPLICATION: ICD-10-CM

## 2020-04-17 DIAGNOSIS — R53.83 OTHER FATIGUE: ICD-10-CM

## 2020-04-17 DIAGNOSIS — F43.9 STRESS: ICD-10-CM

## 2020-04-17 DIAGNOSIS — R19.5 LOOSE BOWEL MOVEMENTS: Primary | ICD-10-CM

## 2020-04-17 RX ORDER — PROMETHAZINE HYDROCHLORIDE 25 MG/1
25 TABLET ORAL
Qty: 10 TAB | Refills: 0 | Status: SHIPPED | OUTPATIENT
Start: 2020-04-17 | End: 2021-08-16

## 2020-04-17 NOTE — PROGRESS NOTES
Written by Eileen Colunga, as dictated by Dr. Beatriz Krause MD.    Stuart Perry is a 26 y.o. female. HPI   I was in the office while conducting this encounter. Consent:  She and/or her healthcare decision maker is aware that this patient-initiated Telehealth encounter is a billable service, with coverage as determined by her insurance carrier. She is aware that she may receive a bill and has provided verbal consent to proceed: Yes    This virtual visit was conducted via [email or web address]. Pursuant to the emergency declaration under the [facility], 1135 waiver authority and the [employer] Act, this Virtual  Visit was conducted to reduce the patient's risk of exposure to COVID-19 and provide continuity of care for an established patient. Services were provided through a video synchronous discussion virtually to substitute for in-person clinic visit. Due to this being a TeleHealth evaluation, many elements of the physical examination are unable to be assessed. The patient presents today c/o diarrhea and vomiting since Tuesday 4/14/2020. She  then started vomiting Tuesday night into Wednesday morning 4/15/2020. She reports she took Zofran and had ginger ale but  threw it up. She has only has liquids for the past few days but she did eat a hot dog this morning. She did not go to the ED but did 24/7 Telemedicine. She reports she has been experiencing some fatigue. She spoke to urgent care over the phone and was prescribed her Phenergan 25 mg. She states she has loose stools for last 2 days. She does not take Vitamin C or Vitamin D. Denies fever. She reports she had surgery performed by Dr. Pat Saab (OB/GYN) to remove fibroids and a cyst from her R ovary in January 2020. She states she did have some pain near her incision site but the pain has subsided now.  She has missed two of her follow-up appointments due to COVID-19. She has been stressed due to COVID-19. Of note, she is working from home. Patient Active Problem List   Diagnosis Code   (none) - all problems resolved or deleted        Current Outpatient Medications on File Prior to Visit   Medication Sig Dispense Refill    albuterol (PROVENTIL HFA, VENTOLIN HFA, PROAIR HFA) 90 mcg/actuation inhaler INHALE 1 PUFF BY MOUTH EVERY 6 HOURS AS NEEDED FOR WHEEZING 18 Inhaler 0    montelukast (Singulair) 10 mg tablet Take 1 Tab by mouth daily. 90 Tab 0    fluticasone propion-salmeteroL (ADVAIR/WIXELA) 250-50 mcg/dose diskus inhaler INHALE 1 PUFF BY MOUTH EVERY 12 HOURS 3 Inhaler 1    fexofenadine-pseudoephedrine (Allegra-D 24 Hour) 180-240 mg per tablet Take 1 Tab by mouth daily for 90 days. 90 Tab 0    citalopram (CELEXA) 10 mg tablet TAKE 1 TABLET BY MOUTH DAILY 30 Tab 0    ascorbic acid, vitamin C, (VITAMIN C) 1,000 mg tablet Take 1,000 mg by mouth.  ergocalciferol (VITAMIN D2) 50,000 unit capsule Take 50,000 Units by mouth.  Biotin 2,500 mcg cap Take  by mouth.  vitamin e (E GEMS) 100 unit capsule Take  by mouth daily.  zinc oxide-cod liver oil (DESITIN) 40 % ointment Apply  to affected area as needed for Skin Irritation.  folic acid-vit L6-BNZ O83 2.2-25-1 mg tab Take  by mouth.  methylPREDNISolone (MEDROL DOSEPACK) 4 mg tablet As directed. (Patient not taking: Reported on 11/15/2019) 1 Dose Pack 0    traZODone (DESYREL) 50 mg tablet Take 50 mg by mouth. No current facility-administered medications on file prior to visit.         Allergies   Allergen Reactions    Aspirin Nausea and Vomiting    Nut - Unspecified Anaphylaxis    Peanut Anaphylaxis     And tree nuts    Penicillins Rash    Percocet [Oxycodone-Acetaminophen] Itching    Aspirin Other (comments)     Muscle spasms    Pcn [Penicillins] Unknown (comments)    Percocet [Oxycodone-Acetaminophen] Rash    Sulfa (Sulfonamide Antibiotics) Other (comments)     States it was a childhood reaction does not remember       Past Medical History:   Diagnosis Date    Arthritis     Asthma     Psychiatric disorder     depression and anxiety       No past surgical history on file.     Family History   Problem Relation Age of Onset   [facility] Mother     Hypertension Father     Diabetes Father     Diabetes Brother     Asthma Maternal Grandmother     No Known Problems Paternal Grandmother     Hypertension Brother        Social History     Socioeconomic History    Marital status:      Spouse name: Not on file    Number of children: Not on file    Years of education: Not on file    Highest education level: Not on file   Occupational History    Not on file   Social Needs    Financial resource strain: Not on file    Food insecurity     Worry: Not on file     Inability: Not on file    Transportation needs     Medical: Not on file     Non-medical: Not on file   Tobacco Use    Smoking status: Never Smoker    Smokeless tobacco: Never Used   Substance and Sexual Activity    Alcohol use: Yes     Comment: occ/socially-    Drug use: Yes     Types: Marijuana     Comment: Recreational/Insomnia     Sexual activity: Yes     Partners: Female     Birth control/protection: None   Lifestyle    Physical activity     Days per week: Not on file     Minutes per session: Not on file    Stress: Not on file   Relationships    Social connections     Talks on phone: Not on file     Gets together: Not on file     Attends Christianity service: Not on file     Active member of club or organization: Not on file     Attends meetings of clubs or organizations: Not on file     Relationship status: Not on file    Intimate partner violence     Fear of current or ex partner: Not on file     Emotionally abused: Not on file     Physically abused: Not on file     Forced sexual activity: Not on file   Other Topics Concern    Not on file   Social History Narrative ** Merged History Encounter **            No visits with results within 3 Month(s) from this visit. Latest known visit with results is:   Office Visit on 11/15/2019   Component Date Value Ref Range Status    Color (UA POC) 11/15/2019 Red   Final    Clarity (UA POC) 11/15/2019 Cloudy   Final    Glucose (UA POC) 11/15/2019 Negative  Negative Final    Bilirubin (UA POC) 11/15/2019 1+  Negative Final    Ketones (UA POC) 11/15/2019 Negative  Negative Final    Specific gravity (UA POC) 11/15/2019 1.020  1.001 - 1.035 Final    Blood (UA POC) 11/15/2019 3+  Negative Final    pH (UA POC) 11/15/2019 7.5  4.6 - 8.0 Final    Protein (UA POC) 11/15/2019 2+  Negative Final    Urobilinogen (UA POC) 11/15/2019 2 mg/dL  0.2 - 1 Final    3.2 umol/L    Nitrites (UA POC) 11/15/2019 Negative  Negative Final    Leukocyte esterase (UA POC) 11/15/2019 Trace  Negative Final     Review of Systems   Constitutional: Positive for malaise/fatigue. Eyes: Negative for blurred vision. Cardiovascular: Negative for chest pain and leg swelling. Gastrointestinal: Positive for diarrhea, nausea and vomiting. Negative for heartburn. Neurological: Negative for dizziness and headaches. Psychiatric/Behavioral: Negative for depression and substance abuse. The patient is not nervous/anxious and does not have insomnia. Physical Exam  Constitutional:       General: She is not in acute distress. Appearance: She is well-developed. She is not diaphoretic. HENT:      Head: Normocephalic and atraumatic. Nose: No congestion. Eyes:      General:         Right eye: No discharge. Left eye: No discharge. Conjunctiva/sclera: Conjunctivae normal.   Pulmonary:      Effort: Pulmonary effort is normal. No respiratory distress. Neurological:      Mental Status: She is alert and oriented to person, place, and time. Psychiatric:         Behavior: Behavior normal.         Thought Content:  Thought content normal. ASSESSMENT and PLAN    ICD-10-CM ICD-9-CM    1. Loose bowel movements R19.5 787.7 Explained to pt she may have gastritis and she is recovering now   2. Nausea R11.0 787.02 promethazine (PHENERGAN) 25 mg tablet sent to pharmacy     Phenergan 25 mg refilled    3. Other fatigue R53.83 780.79 Advised pt to take Vitamin C and Vitamin D because this can help strengthen immunity   4. S/P myomectomy Z98.890 V45.89 Followed by OB/GYN. Advised pt to go in for a follow-up after COVID-19 concerns are over    5. Moderate persistent asthma without complication Q94.91 180.50 Controlled on current regimen. 6. Stress F43.9 V62.89 Declines counseling sessions. Total Time: minutes: 11-20 minutes. This plan was reviewed with the patient and patient agrees. All questions were answered. This scribe documentation was reviewed by me and accurately reflects the examination and decisions made by me. This note will not be viewable in [address]

## 2020-04-28 DIAGNOSIS — J45.40 MODERATE PERSISTENT ASTHMA WITHOUT COMPLICATION: ICD-10-CM

## 2020-04-28 RX ORDER — ALBUTEROL SULFATE 90 UG/1
AEROSOL, METERED RESPIRATORY (INHALATION)
Qty: 18 INHALER | Refills: 0 | Status: SHIPPED | OUTPATIENT
Start: 2020-04-28 | End: 2021-11-15 | Stop reason: ALTCHOICE

## 2020-06-24 DIAGNOSIS — J45.40 MODERATE PERSISTENT ASTHMA WITHOUT COMPLICATION: ICD-10-CM

## 2020-06-24 DIAGNOSIS — J30.9 ALLERGIC RHINITIS, UNSPECIFIED SEASONALITY, UNSPECIFIED TRIGGER: ICD-10-CM

## 2020-06-24 RX ORDER — MONTELUKAST SODIUM 10 MG/1
TABLET ORAL
Qty: 90 TAB | Refills: 0 | Status: SHIPPED | OUTPATIENT
Start: 2020-06-24 | End: 2020-10-22 | Stop reason: SDUPTHER

## 2020-06-26 ENCOUNTER — PATIENT MESSAGE (OUTPATIENT)
Dept: PRIMARY CARE CLINIC | Age: 30
End: 2020-06-26

## 2020-07-22 ENCOUNTER — VIRTUAL VISIT (OUTPATIENT)
Dept: PRIMARY CARE CLINIC | Age: 30
End: 2020-07-22

## 2020-07-22 DIAGNOSIS — F41.9 ANXIETY: Primary | ICD-10-CM

## 2020-07-22 DIAGNOSIS — F51.01 PRIMARY INSOMNIA: ICD-10-CM

## 2020-07-22 DIAGNOSIS — F41.0 PANIC ATTACK: ICD-10-CM

## 2020-07-22 DIAGNOSIS — J45.40 MODERATE PERSISTENT ASTHMA WITHOUT COMPLICATION: ICD-10-CM

## 2020-07-22 RX ORDER — CITALOPRAM 20 MG/1
20 TABLET, FILM COATED ORAL DAILY
Qty: 90 TAB | Refills: 0 | Status: SHIPPED | OUTPATIENT
Start: 2020-07-22 | End: 2020-10-22 | Stop reason: SDUPTHER

## 2020-07-22 RX ORDER — TRAZODONE HYDROCHLORIDE 50 MG/1
50 TABLET ORAL
Qty: 90 TAB | Refills: 0 | Status: SHIPPED | OUTPATIENT
Start: 2020-07-22 | End: 2020-10-22 | Stop reason: SDUPTHER

## 2020-07-22 NOTE — PROGRESS NOTES
Written by Shannon Ford, as dictated by Dr. Nii Gaston MD.    James Burleson is a 26 y.o. female. HPI  I was in the office while conducting this encounter. Consent:  She and/or her healthcare decision maker is aware that this patient-initiated Telehealth encounter is a billable service, with coverage as determined by her insurance carrier. She is aware that she may receive a bill and has provided verbal consent to proceed: Yes    This virtual visit was conducted via [employer]. Pursuant to the emergency declaration under the [facility], 1135 waiver authority and the [employer] Act, this Virtual  Visit was conducted to reduce the patient's risk of exposure to COVID-19 and provide continuity of care for an established patient. Services were provided through a video synchronous discussion virtually to substitute for in-person clinic visit. Due to this being a TeleHealth evaluation, many elements of the physical examination are unable to be assessed. Pt presents virtually today to discuss increased stress and anxiety. She is working from home for an [employer] and she documents whenever a cardholder dies. She is getting calls from all over the [facility] and [facility] about people dying from [place], and she has nowhere to go to [employer] since home is usually her space for rest, but now it is also her workplace. Her grandmother is also taking care of her significant other, and Ms. Aisha Arias is worried about the toll this is taking on her grandmother. Overall, she is just feeling overwhelmed by everything going on in her life currently. Right now, she is taking Celexa 10 mg, and she used to be taking lorazepam to help her sleep but has run out of this. She was going to [facility] for [facility] but ended up d/c this due to [place].  She is trying to begin seeing a new therapist, Arpan Pichardo, 2-3x per week, but he is going on vacation currently. The work stress is causing severe anxiety, panic attacks, and asthma attacks. Since the process of starting therapy and seeing a therapist 2-3x per week is going to be time consuming and tiring, she would like to complete [employer] paperwork for continuous leave from her job. Patient Active Problem List   Diagnosis Code   (none) - all problems resolved or deleted        Current Outpatient Medications on File Prior to Visit   Medication Sig Dispense Refill    montelukast (SINGULAIR) 10 mg tablet TAKE 1 TABLET BY MOUTH EVERY DAY 90 Tab 0    albuterol (PROVENTIL HFA, VENTOLIN HFA, PROAIR HFA) 90 mcg/actuation inhaler INHALE 1 PUFF BY MOUTH EVERY 6 HOURS AS NEEDED FOR WHEEZING 18 Inhaler 0    promethazine (PHENERGAN) 25 mg tablet Take 1 Tab by mouth every eight (8) hours as needed for Nausea for up to 10 doses. 10 Tab 0    fluticasone propion-salmeteroL (ADVAIR/WIXELA) 250-50 mcg/dose diskus inhaler INHALE 1 PUFF BY MOUTH EVERY 12 HOURS 3 Inhaler 1    citalopram (CELEXA) 10 mg tablet TAKE 1 TABLET BY MOUTH DAILY 30 Tab 0    ascorbic acid, vitamin C, (VITAMIN C) 1,000 mg tablet Take 1,000 mg by mouth.  ergocalciferol (VITAMIN D2) 50,000 unit capsule Take 50,000 Units by mouth.  Biotin 2,500 mcg cap Take  by mouth.  vitamin e (E GEMS) 100 unit capsule Take  by mouth daily.  zinc oxide-cod liver oil (DESITIN) 40 % ointment Apply  to affected area as needed for Skin Irritation.  folic acid-vit S3-LTN L71 2.2-25-1 mg tab Take  by mouth.  [DISCONTINUED] methylPREDNISolone (MEDROL DOSEPACK) 4 mg tablet As directed. (Patient not taking: Reported on 11/15/2019) 1 Dose Pack 0    traZODone (DESYREL) 50 mg tablet Take 50 mg by mouth. No current facility-administered medications on file prior to visit.         Allergies   Allergen Reactions    Aspirin Nausea and Vomiting    Nut - Unspecified Anaphylaxis    Peanut Anaphylaxis     And tree nuts    Penicillins Rash    Percocet [Oxycodone-Acetaminophen] Itching    Aspirin Other (comments)     Muscle spasms    Pcn [Penicillins] Unknown (comments)    Percocet [Oxycodone-Acetaminophen] Rash    Sulfa (Sulfonamide Antibiotics) Other (comments)     States it was a childhood reaction does not remember       Past Medical History:   Diagnosis Date    Arthritis     Asthma     Psychiatric disorder     depression and anxiety       No past surgical history on file.     Family History   Problem Relation Age of Onset   [facility] Cancer Mother     Hypertension Father     Diabetes Father     Diabetes Brother     Asthma Maternal Grandmother     No Known Problems Paternal Grandmother     Hypertension Brother        Social History     Socioeconomic History    Marital status:      Spouse name: Not on file    Number of children: Not on file    Years of education: Not on file    Highest education level: Not on file   Occupational History    Not on file   Social Needs    Financial resource strain: Not on file    Food insecurity     Worry: Not on file     Inability: Not on file   [employer] needs     Medical: Not on file     Non-medical: Not on file   Tobacco Use    Smoking status: Never Smoker    Smokeless tobacco: Never Used   Substance and Sexual Activity    Alcohol use: Yes     Comment: occ/socially-    Drug use: Yes     Types: Marijuana     Comment: Recreational/Insomnia     Sexual activity: Yes     Partners: Female     Birth control/protection: None   Lifestyle    Physical activity     Days per week: Not on file     Minutes per session: Not on file    Stress: Not on file   Relationships    Social connections     Talks on phone: Not on file     Gets together: Not on file     Attends Shinto service: Not on file     Active member of club or organization: Not on file     Attends meetings of clubs or organizations: Not on file     Relationship status: Not on file  Intimate partner violence     Fear of current or ex partner: Not on file     Emotionally abused: Not on file     Physically abused: Not on file     Forced sexual activity: Not on file   Other Topics Concern    Not on file   Social History Narrative    ** Merged History Encounter **            No visits with results within 3 Month(s) from this visit. Latest known visit with results is:   Office Visit on 11/15/2019   Component Date Value Ref Range Status    Color (UA POC) 11/15/2019 Red   Final    Clarity (UA POC) 11/15/2019 Cloudy   Final    Glucose (UA POC) 11/15/2019 Negative  Negative Final    Bilirubin (UA POC) 11/15/2019 1+  Negative Final    Ketones (UA POC) 11/15/2019 Negative  Negative Final    Specific gravity (UA POC) 11/15/2019 1.020  1.001 - 1.035 Final    Blood (UA POC) 11/15/2019 3+  Negative Final    pH (UA POC) 11/15/2019 7.5  4.6 - 8.0 Final    Protein (UA POC) 11/15/2019 2+  Negative Final    Urobilinogen (UA POC) 11/15/2019 2 mg/dL  0.2 - 1 Final    3.2 umol/L    Nitrites (UA POC) 11/15/2019 Negative  Negative Final    Leukocyte esterase (UA POC) 11/15/2019 Trace  Negative Final     Review of Systems   Constitutional: Negative for malaise/fatigue and weight loss. HENT: Negative for congestion and sore throat. Eyes: Negative for blurred vision. Respiratory: Negative for cough and shortness of breath. +asthma attack   Cardiovascular: Negative for chest pain and leg swelling. Gastrointestinal: Negative for constipation and heartburn. Genitourinary: Negative for frequency and urgency. Musculoskeletal: Negative for back pain, joint pain and myalgias. Neurological: Negative for dizziness and headaches. Psychiatric/Behavioral: Positive for depression. The patient is nervous/anxious and has insomnia. There were no vitals taken for this visit. Physical Exam  Nursing note reviewed. Constitutional:       Appearance: Normal appearance.  She is well-developed and well-groomed. HENT:      Head: Normocephalic and atraumatic. Nose: No congestion. Eyes:      General:         Right eye: No discharge. Left eye: No discharge. Pulmonary:      Effort: Pulmonary effort is normal.      Breath sounds: No wheezing. Neurological:      Mental Status: She is alert and oriented to person, place, and time. Psychiatric:         Mood and Affect: Mood normal.         Behavior: Behavior normal.       ASSESSMENT and PLAN    ICD-10-CM ICD-9-CM    1. Anxiety  F41.9 300.00 citalopram (CELEXA) 20 mg tablet sent to pharmacy. I increased her Celexa from 10 mg to 20 mg.      2. Panic attack  F41.0 300.01 I completed [facility] paperwork to provide her 3 months of continuous leave from her job to seek therapy. 3. Moderate persistent asthma without complication  S87.85 583.50 Most likely triggered by anxiety and panic attacks. I completed [facility] paperwork to allow her leave to see a therapist.      4. Primary insomnia  F51.01 307.42 traZODone (DESYREL) 50 mg tablet sent to pharmacy. I refilled her trazodone to help her sleep. Total time spent including completing paper work 25 minutes. This plan was reviewed with the patient and patient agrees. All questions were answered. This scribe documentation was reviewed by me and accurately reflects the examination and decisions made by me. This note will not be viewable in [address]

## 2020-08-10 ENCOUNTER — TELEPHONE (OUTPATIENT)
Dept: PRIMARY CARE CLINIC | Age: 30
End: 2020-08-10

## 2020-08-18 ENCOUNTER — OFFICE VISIT (OUTPATIENT)
Dept: PRIMARY CARE CLINIC | Age: 30
End: 2020-08-18
Payer: COMMERCIAL

## 2020-08-18 VITALS
SYSTOLIC BLOOD PRESSURE: 112 MMHG | DIASTOLIC BLOOD PRESSURE: 67 MMHG | OXYGEN SATURATION: 100 % | RESPIRATION RATE: 18 BRPM | TEMPERATURE: 97.9 F | HEART RATE: 78 BPM

## 2020-08-18 DIAGNOSIS — M79.605 PAIN IN BOTH LOWER EXTREMITIES: Primary | ICD-10-CM

## 2020-08-18 DIAGNOSIS — M79.604 PAIN IN BOTH LOWER EXTREMITIES: Primary | ICD-10-CM

## 2020-08-18 DIAGNOSIS — J45.40 MODERATE PERSISTENT ASTHMA WITHOUT COMPLICATION: ICD-10-CM

## 2020-08-18 DIAGNOSIS — Z87.81 HISTORY OF ANKLE FRACTURE: ICD-10-CM

## 2020-08-18 DIAGNOSIS — F41.9 ANXIETY: ICD-10-CM

## 2020-08-18 DIAGNOSIS — K21.9 GASTROESOPHAGEAL REFLUX DISEASE WITHOUT ESOPHAGITIS: ICD-10-CM

## 2020-08-18 DIAGNOSIS — Z29.9 DVT PROPHYLAXIS: ICD-10-CM

## 2020-08-18 PROCEDURE — 99214 OFFICE O/P EST MOD 30 MIN: CPT | Performed by: INTERNAL MEDICINE

## 2020-08-18 RX ORDER — ENOXAPARIN SODIUM 100 MG/ML
40 INJECTION SUBCUTANEOUS 2 TIMES DAILY
COMMUNITY
Start: 2020-08-03 | End: 2021-08-16

## 2020-08-18 RX ORDER — CYCLOBENZAPRINE HCL 10 MG
10 TABLET ORAL
COMMUNITY
Start: 2020-08-14

## 2020-09-02 ENCOUNTER — VIRTUAL VISIT (OUTPATIENT)
Dept: PRIMARY CARE CLINIC | Age: 30
End: 2020-09-02
Payer: COMMERCIAL

## 2020-09-02 DIAGNOSIS — Z87.81 HISTORY OF ANKLE FRACTURE: ICD-10-CM

## 2020-09-02 DIAGNOSIS — R07.9 CHEST PAIN, UNSPECIFIED TYPE: Primary | ICD-10-CM

## 2020-09-02 DIAGNOSIS — M25.571 ACUTE RIGHT ANKLE PAIN: ICD-10-CM

## 2020-09-02 PROCEDURE — 99214 OFFICE O/P EST MOD 30 MIN: CPT | Performed by: NURSE PRACTITIONER

## 2020-09-02 NOTE — PROGRESS NOTES
[facility]   Tania Aguayo [address] [facility], [address] [place] [address]  P: [phone]  F: [phone]    CONG Garcia is a 26 y.o. female who is seen over telehealth for Foot Swelling (pain and infection) and Chest Pain. HPI:  Patient presents with report of intermittent chest pain for the past 10 days. Symptoms are getting progressively worse. Endorses sharp intermittent chest pain inferior to right breast.  Previously we low left breast.  Rates pain at 10 out of 10 last night. Took Ultram but it was not very effective. Followed by [facility] and treated for an open dislocation of her right ankle in July. Endorses malodorous drainage oozing from Steri-Strips. Dr. Soto Given at the foot and ankle center prescribed doxycycline 100 mg p.o. daily for the infection. Currently on day 5 without improvement in symptoms per patient. She takes Lovenox for anticoagulation. States home health nurse told her blood pressure was elevated on Monday. Patient Active Problem List    Diagnosis    Moderate persistent asthma without complication    Anxiety          Past Medical History:   Diagnosis Date    Arthritis     Asthma     Psychiatric disorder     depression and anxiety     History reviewed. No pertinent surgical history.   Social History     Socioeconomic History    Marital status:      Spouse name: Not on file    Number of children: Not on file    Years of education: Not on file    Highest education level: Not on file   Occupational History    Not on file   Social Needs    Financial resource strain: Not on file    Food insecurity     Worry: Not on file     Inability: Not on file    Transportation needs     Medical: Not on file     Non-medical: Not on file   Tobacco Use    Smoking status: Never Smoker    Smokeless tobacco: Never Used   Substance and Sexual Activity    Alcohol use: Yes     Comment: occ/socially-    Drug use: Yes     Types: Marijuana Comment: Recreational/Insomnia     Sexual activity: Yes     Partners: Female     Birth control/protection: None   Lifestyle    Physical activity     Days per week: Not on file     Minutes per session: Not on file    Stress: Not on file   Relationships    Social connections     Talks on phone: Not on file     Gets together: Not on file     Attends Protestant service: Not on file     Active member of club or organization: Not on file     Attends meetings of clubs or organizations: Not on file     Relationship status: Not on file    Intimate partner violence     Fear of current or ex partner: Not on file     Emotionally abused: Not on file     Physically abused: Not on file     Forced sexual activity: Not on file   Other Topics Concern    Not on file   Social History Narrative    ** Merged History Encounter **          Family History   Problem Relation Age of Onset    Cancer Mother     Hypertension Father     Diabetes Father     Diabetes Brother     Asthma Maternal Grandmother     No Known Problems Paternal Grandmother     Hypertension Brother      Allergies   Allergen Reactions    Aspirin Nausea and Vomiting    Nut - Unspecified Anaphylaxis    Peanut Anaphylaxis     And tree nuts    Penicillins Rash    Percocet [Oxycodone-Acetaminophen] Itching    Aspirin Other (comments)     Muscle spasms    Pcn [Penicillins] Unknown (comments)    Percocet [Oxycodone-Acetaminophen] Rash    Sulfa (Sulfonamide Antibiotics) Other (comments)     States it was a childhood reaction does not remember       Current Outpatient Medications   Medication Sig Dispense Refill    enoxaparin (LOVENOX) 40 mg/0.4 mL 40 mg by SubCUTAneous route two (2) times a day.  cyclobenzaprine (FLEXERIL) 10 mg tablet Take 10 mg by mouth three (3) times daily as needed.  traZODone (DESYREL) 50 mg tablet Take 1 Tab by mouth nightly for 90 days. 90 Tab 0    citalopram (CELEXA) 20 mg tablet Take 1 Tab by mouth daily for 90 days.  90 Tab 0    montelukast (SINGULAIR) 10 mg tablet TAKE 1 TABLET BY MOUTH EVERY DAY 90 Tab 0    albuterol (PROVENTIL HFA, VENTOLIN HFA, PROAIR HFA) 90 mcg/actuation inhaler INHALE 1 PUFF BY MOUTH EVERY 6 HOURS AS NEEDED FOR WHEEZING 18 Inhaler 0    promethazine (PHENERGAN) 25 mg tablet Take 1 Tab by mouth every eight (8) hours as needed for Nausea for up to 10 doses. 10 Tab 0    fluticasone propion-salmeteroL (ADVAIR/WIXELA) 250-50 mcg/dose diskus inhaler INHALE 1 PUFF BY MOUTH EVERY 12 HOURS 3 Inhaler 1    ascorbic acid, vitamin C, (VITAMIN C) 1,000 mg tablet Take 1,000 mg by mouth.  ergocalciferol (VITAMIN D2) 50,000 unit capsule Take 50,000 Units by mouth.  Biotin 2,500 mcg cap Take  by mouth.  vitamin e (E GEMS) 100 unit capsule Take  by mouth daily.  zinc oxide-cod liver oil (DESITIN) 40 % ointment Apply  to affected area as needed for Skin Irritation.  folic acid-vit Q4-WHZ F68 2.2-25-1 mg tab Take  by mouth.  traZODone (DESYREL) 50 mg tablet Take 50 mg by mouth. The medications were reviewed and updated in the medical record. The past medical history, past surgical history, and family history were reviewed and updated in the medical record. REVIEW OF SYSTEMS   Review of Systems   Constitutional: Positive for malaise/fatigue. Negative for chills and fever. Respiratory: Negative for cough, sputum production and shortness of breath. Cardiovascular: Positive for chest pain (intermittent with sitting up) and leg swelling (s/p injury). Musculoskeletal: Positive for falls and joint pain (ankle). Neurological: Positive for sensory change. Psychiatric/Behavioral: The patient is nervous/anxious. All other systems reviewed and are negative. PHYSICAL EXAM   NO VITALS WERE TAKEN FOR THIS VISIT  Physical Exam  Constitutional:       General: She is not in acute distress. Appearance: Normal appearance. HENT:      Head: Normocephalic and atraumatic.    Eyes: General:         Right eye: No discharge. Left eye: No discharge. Pulmonary:      Effort: Pulmonary effort is normal. No respiratory distress. Musculoskeletal:      Right ankle: She exhibits swelling. Tenderness. Neurological:      Mental Status: She is alert and oriented to person, place, and time. Psychiatric:         Attention and Perception: Attention normal.         Mood and Affect: Mood is anxious. Speech: Speech normal.         Behavior: Behavior normal.           ASSESSMENT/ PLAN   Diagnoses and all orders for this visit:    1. Chest pain, unspecified type     -Advised patient to go to ED symptoms are getting progressively worse. 2. Acute right ankle pain      -   Follow up with orthopedics. 3. History of ankle fracture       -   Follow-up with orthopedics and Dr. Venice Rg for antibiotic evaluation. Follow-up and Dispositions    · Return if symptoms worsen or fail to improve. I was in the office while conducting this encounter. Consent:  She and/or her healthcare decision maker is aware that this patient-initiated Telehealth encounter is a billable service, with coverage as determined by her insurance carrier. She is aware that she may receive a bill and has provided verbal consent to proceed: Yes    This virtual visit was conducted via [email or web address]. Pursuant to the emergency declaration under the [facility], 1135 waiver authority and the [employer] and [employer] Act, this Virtual  Visit was conducted to reduce the patient's risk of exposure to COVID-19 and provide continuity of care for an established patient. Services were provided through a video synchronous discussion virtually to substitute for in-person clinic visit. Due to this being a TeleHealth evaluation, many elements of the physical examination are unable to be assessed.      Total Time: minutes: 21-30 minutes. Disclaimer:  Advised patient to call back or return to office if symptoms worsen/change/persist.  Discussed expected course/resolution/complications of diagnosis in detail with patient. Medication risks/benefits/alternatives discussed with patient. Patient was given an after visit summary which includes diagnoses, current medications, & vitals. Discussed patient instructions and advised to read to all patient instructions regarding care. Patient expressed understanding with the diagnosis and plan. This note will not be viewable in [address]         Soledad Spring NP  9/2/2020        (This document has been electronically signed)

## 2020-09-21 ENCOUNTER — DOCUMENTATION ONLY (OUTPATIENT)
Dept: PRIMARY CARE CLINIC | Age: 30
End: 2020-09-21

## 2020-09-21 ENCOUNTER — VIRTUAL VISIT (OUTPATIENT)
Dept: PRIMARY CARE CLINIC | Age: 30
End: 2020-09-21
Payer: COMMERCIAL

## 2020-09-21 DIAGNOSIS — Z87.81 HISTORY OF ANKLE FRACTURE: ICD-10-CM

## 2020-09-21 DIAGNOSIS — M25.572 CHRONIC PAIN OF BOTH ANKLES: Primary | ICD-10-CM

## 2020-09-21 DIAGNOSIS — M25.571 CHRONIC PAIN OF BOTH ANKLES: Primary | ICD-10-CM

## 2020-09-21 DIAGNOSIS — F41.9 ANXIETY: ICD-10-CM

## 2020-09-21 DIAGNOSIS — M79.89 LEG SWELLING: ICD-10-CM

## 2020-09-21 DIAGNOSIS — G89.29 CHRONIC PAIN OF BOTH ANKLES: Primary | ICD-10-CM

## 2020-09-21 PROCEDURE — 99214 OFFICE O/P EST MOD 30 MIN: CPT | Performed by: INTERNAL MEDICINE

## 2020-09-21 NOTE — PROGRESS NOTES
shantal paperwork has  Been completed and faxed to [facility] with confirmed receipt received

## 2020-09-21 NOTE — PROGRESS NOTES
Written by Shavonne Luis, as dictated by Dr. Concha Phan MD.    Benji Cox is a 26 y.o. female. HPI  I was in the office while conducting this encounter. Consent:  She and/or her healthcare decision maker is aware that this patient-initiated Telehealth encounter is a billable service, with coverage as determined by her insurance carrier. She is aware that she may receive a bill and has provided verbal consent to proceed: Yes    This virtual visit was conducted via [address] Pursuant to the emergency declaration under the [facility], 1135 waiver authority and the [employer] and [employer] Act, this Virtual  Visit was conducted to reduce the patient's risk of exposure to COVID-19 and provide continuity of care for an established patient. Services were provided through a video synchronous discussion virtually to substitute for in-person clinic visit. Due to this being a TeleHealth evaluation, many elements of the physical examination are unable to be assessed. Pt presents virtually today to follow up on her B/L ankle fractures on 07/28/20, accompanied by her wife. She has just gotten over an infection complication from the fractures, and she is currently still in a wheelchair with leg swelling. She has not made it to PT yet but is hoping to start soon. She has seen an ankle specialist recently who estimated her date of full recovery to be 01/01/20. She has been admitted twice for this, once initially for the surgery and once for a resulting infection. She is currently taking Flexeril and Tylenol for pain. She followed up with her surgeon last week and was d/c on Lovenox but is still taking clindamycin. She has been struggling with leg swelling and is concerned that it keeps happening, as she is wheelchair bound. She has an appt next week with Dr. Vika Dietrich (ankle specialist).  She also will be sitting a lot when she goes back to work. She continues on Celexa 20 mg and her wife notes that she has been anxious. She is taking Singulair. Patient Active Problem List   Diagnosis Code    Moderate persistent asthma without complication L20.36    Anxiety F41.9        Current Outpatient Medications on File Prior to Visit   Medication Sig Dispense Refill    fluticasone propion-salmeteroL (Advair Diskus) 250-50 mcg/dose diskus inhaler TAKE 1 PUFF BY MOUTH EVERY 12 HOURS 1 Each 1    enoxaparin (LOVENOX) 40 mg/0.4 mL 40 mg by SubCUTAneous route two (2) times a day.  cyclobenzaprine (FLEXERIL) 10 mg tablet Take 10 mg by mouth three (3) times daily as needed.  traZODone (DESYREL) 50 mg tablet Take 1 Tab by mouth nightly for 90 days. 90 Tab 0    citalopram (CELEXA) 20 mg tablet Take 1 Tab by mouth daily for 90 days. 90 Tab 0    montelukast (SINGULAIR) 10 mg tablet TAKE 1 TABLET BY MOUTH EVERY DAY 90 Tab 0    albuterol (PROVENTIL HFA, VENTOLIN HFA, PROAIR HFA) 90 mcg/actuation inhaler INHALE 1 PUFF BY MOUTH EVERY 6 HOURS AS NEEDED FOR WHEEZING 18 Inhaler 0    promethazine (PHENERGAN) 25 mg tablet Take 1 Tab by mouth every eight (8) hours as needed for Nausea for up to 10 doses. 10 Tab 0    ascorbic acid, vitamin C, (VITAMIN C) 1,000 mg tablet Take 1,000 mg by mouth.  ergocalciferol (VITAMIN D2) 50,000 unit capsule Take 50,000 Units by mouth.  Biotin 2,500 mcg cap Take  by mouth.  vitamin e (E GEMS) 100 unit capsule Take  by mouth daily.  zinc oxide-cod liver oil (DESITIN) 40 % ointment Apply  to affected area as needed for Skin Irritation.  folic acid-vit Q5-XCG G49 2.2-25-1 mg tab Take  by mouth.  traZODone (DESYREL) 50 mg tablet Take 50 mg by mouth. No current facility-administered medications on file prior to visit.         Allergies   Allergen Reactions    Aspirin Nausea and Vomiting    Nut - Unspecified Anaphylaxis    Peanut Anaphylaxis     And tree nuts    Penicillins Rash    Percocet [Oxycodone-Acetaminophen] Itching    Aspirin Other (comments)     Muscle spasms    Pcn [Penicillins] Unknown (comments)    Percocet [Oxycodone-Acetaminophen] Rash    Sulfa (Sulfonamide Antibiotics) Other (comments)     States it was a childhood reaction does not remember       Past Medical History:   Diagnosis Date    Arthritis     Asthma     Psychiatric disorder     depression and anxiety       No past surgical history on file.     Family History   Problem Relation Age of Onset   [address] [facility] Mother     Hypertension Father     Diabetes Father     Diabetes Brother     Asthma Maternal Grandmother     No Known Problems Paternal Grandmother     Hypertension Brother        Social History     Socioeconomic History    Marital status:      Spouse name: Not on file    Number of children: Not on file    Years of education: Not on file    Highest education level: Not on file   Occupational History    Not on file   Social Needs    Financial resource strain: Not on file    Food insecurity     Worry: Not on file     Inability: Not on file   [employer] needs     Medical: Not on file     Non-medical: Not on file   Tobacco Use    Smoking status: Never Smoker    Smokeless tobacco: Never Used   Substance and Sexual Activity    Alcohol use: Yes     Comment: occ/socially-    Drug use: Yes     Types: Marijuana     Comment: Recreational/Insomnia     Sexual activity: Yes     Partners: Female     Birth control/protection: None   Lifestyle    Physical activity     Days per week: Not on file     Minutes per session: Not on file    Stress: Not on file   Relationships    Social connections     Talks on phone: Not on file     Gets together: Not on file     Attends Latter day service: Not on file     Active member of club or organization: Not on file     Attends meetings of clubs or organizations: Not on file     Relationship status: Not on file    Intimate partner violence     Fear of current or ex partner: Not on file     Emotionally abused: Not on file     Physically abused: Not on file     Forced sexual activity: Not on file   Other Topics Concern    Not on file   Social History Narrative    ** Merged History Encounter **            No visits with results within 3 Month(s) from this visit. Latest known visit with results is:   Office Visit on 11/15/2019   Component Date Value Ref Range Status    Color (UA POC) 11/15/2019 Red   Final    Clarity (UA POC) 11/15/2019 Cloudy   Final    Glucose (UA POC) 11/15/2019 Negative  Negative Final    Bilirubin (UA POC) 11/15/2019 1+  Negative Final    Ketones (UA POC) 11/15/2019 Negative  Negative Final    Specific gravity (UA POC) 11/15/2019 1.020  1.001 - 1.035 Final    Blood (UA POC) 11/15/2019 3+  Negative Final    pH (UA POC) 11/15/2019 7.5  4.6 - 8.0 Final    Protein (UA POC) 11/15/2019 2+  Negative Final    Urobilinogen (UA POC) 11/15/2019 2 mg/dL  0.2 - 1 Final    3.2 umol/L    Nitrites (UA POC) 11/15/2019 Negative  Negative Final    Leukocyte esterase (UA POC) 11/15/2019 Trace  Negative Final     Review of Systems   Constitutional: Negative for malaise/fatigue and weight loss. HENT: Negative for congestion and sore throat. Eyes: Negative for blurred vision. Respiratory: Negative for cough and shortness of breath. Cardiovascular: Positive for leg swelling. Negative for chest pain. Gastrointestinal: Negative for constipation and heartburn. Genitourinary: Negative for frequency and urgency. Musculoskeletal: Positive for joint pain (B/L ankles). Negative for back pain and myalgias. Neurological: Negative for dizziness and headaches. Psychiatric/Behavioral: Negative for depression. The patient is nervous/anxious. The patient does not have insomnia. There were no vitals taken for this visit. Physical Exam  Nursing note reviewed. Constitutional:       Appearance: Normal appearance.  She is well-developed and well-groomed. HENT:      Head: Normocephalic and atraumatic. Nose: No congestion. Eyes:      General:         Right eye: No discharge. Left eye: No discharge. Pulmonary:      Effort: Pulmonary effort is normal.      Breath sounds: No wheezing. Neurological:      Mental Status: She is alert and oriented to person, place, and time. Psychiatric:         Mood and Affect: Mood normal.         Behavior: Behavior normal.       ASSESSMENT and PLAN    ICD-10-CM ICD-9-CM    1. Chronic pain of both ankles  M25.571 719.47 We completed her [facility] paperwork. She is following closely with orthopedics. G89.29 338.29     M25.572     2. History of ankle fracture  Z87.81 V15.51 DUPLEX LOWER EXT VENOUS BILAT    We discussed that the blood is pooling in her legs, and I want to ensure that there are no blood clots. I ordered a Doppler to ensure she does not have any clots forming and I instructed her to call [phone] to schedule an appt. 3. Anxiety  F41.9 300.00 Stable, continues on Celexa. 4. Leg swelling  M79.89 729.81 DUPLEX LOWER EXT VENOUS BILAT    We discussed that the blood is pooling in her legs, and I want to ensure that there are no blood clots. I ordered a Doppler to ensure she does not have any clots forming and I instructed her to call [phone] to schedule an appt. Total time spent 22 minutes. This plan was reviewed with the patient and patient agrees. All questions were answered. This scribe documentation was reviewed by me and accurately reflects the examination and decisions made by me. This note will not be viewable in [address]

## 2020-10-22 DIAGNOSIS — R07.89 CHEST TIGHTNESS: ICD-10-CM

## 2020-10-22 DIAGNOSIS — F51.01 PRIMARY INSOMNIA: ICD-10-CM

## 2020-10-22 DIAGNOSIS — F41.9 ANXIETY: ICD-10-CM

## 2020-10-22 DIAGNOSIS — J45.40 MODERATE PERSISTENT ASTHMA WITHOUT COMPLICATION: ICD-10-CM

## 2020-10-22 DIAGNOSIS — J30.9 ALLERGIC RHINITIS, UNSPECIFIED SEASONALITY, UNSPECIFIED TRIGGER: ICD-10-CM

## 2020-10-22 RX ORDER — FLUTICASONE PROPIONATE AND SALMETEROL 250; 50 UG/1; UG/1
POWDER RESPIRATORY (INHALATION)
Qty: 1 EACH | Refills: 1 | Status: SHIPPED | OUTPATIENT
Start: 2020-10-22 | End: 2021-11-15 | Stop reason: ALTCHOICE

## 2020-10-22 RX ORDER — TRAZODONE HYDROCHLORIDE 50 MG/1
50 TABLET ORAL
Qty: 90 TAB | Refills: 0 | Status: SHIPPED | OUTPATIENT
Start: 2020-10-22 | End: 2021-01-13

## 2020-10-22 RX ORDER — MONTELUKAST SODIUM 10 MG/1
10 TABLET ORAL DAILY
Qty: 90 TAB | Refills: 0 | Status: SHIPPED | OUTPATIENT
Start: 2020-10-22 | End: 2021-01-13

## 2020-10-22 RX ORDER — CITALOPRAM 20 MG/1
20 TABLET, FILM COATED ORAL DAILY
Qty: 90 TAB | Refills: 0 | Status: SHIPPED | OUTPATIENT
Start: 2020-10-22 | End: 2021-01-13

## 2020-12-27 ENCOUNTER — HOSPITAL ENCOUNTER (EMERGENCY)
Age: 30
Discharge: HOME | End: 2020-12-27
Attending: EMERGENCY MEDICINE
Payer: COMMERCIAL

## 2020-12-27 VITALS
SYSTOLIC BLOOD PRESSURE: 134 MMHG | WEIGHT: 216.27 LBS | HEART RATE: 97 BPM | RESPIRATION RATE: 16 BRPM | TEMPERATURE: 98.9 F | OXYGEN SATURATION: 98 % | HEIGHT: 67 IN | DIASTOLIC BLOOD PRESSURE: 77 MMHG | BODY MASS INDEX: 33.94 KG/M2

## 2020-12-27 DIAGNOSIS — R11.2 NAUSEA AND VOMITING, INTRACTABILITY OF VOMITING NOT SPECIFIED, UNSPECIFIED VOMITING TYPE: Primary | ICD-10-CM

## 2020-12-27 LAB
ALBUMIN SERPL-MCNC: 3.7 G/DL (ref 3.5–5)
ALBUMIN/GLOB SERPL: 0.9 {RATIO} (ref 1.1–2.2)
ALP SERPL-CCNC: 86 U/L (ref 45–117)
ALT SERPL-CCNC: 14 U/L (ref 12–78)
ANION GAP SERPL CALC-SCNC: 10 MMOL/L (ref 5–15)
APPEARANCE UR: CLEAR
AST SERPL-CCNC: 11 U/L (ref 15–37)
BACTERIA URNS QL MICRO: NEGATIVE /HPF
BASOPHILS # BLD: 0 K/UL (ref 0–0.1)
BASOPHILS NFR BLD: 0 % (ref 0–1)
BILIRUB SERPL-MCNC: 0.6 MG/DL (ref 0.2–1)
BILIRUB UR QL: NEGATIVE
BUN SERPL-MCNC: 7 MG/DL (ref 6–20)
BUN/CREAT SERPL: 8 (ref 12–20)
CALCIUM SERPL-MCNC: 9.2 MG/DL (ref 8.5–10.1)
CHLORIDE SERPL-SCNC: 103 MMOL/L (ref 97–108)
CO2 SERPL-SCNC: 26 MMOL/L (ref 21–32)
COLOR UR: ABNORMAL
COMMENT, HOLDF: NORMAL
CREAT SERPL-MCNC: 0.85 MG/DL (ref 0.55–1.02)
DIFFERENTIAL METHOD BLD: ABNORMAL
EOSINOPHIL # BLD: 0.1 K/UL (ref 0–0.4)
EOSINOPHIL NFR BLD: 1 % (ref 0–7)
EPITH CASTS URNS QL MICRO: ABNORMAL /LPF
ERYTHROCYTE [DISTWIDTH] IN BLOOD BY AUTOMATED COUNT: 16.6 % (ref 11.5–14.5)
GLOBULIN SER CALC-MCNC: 4.2 G/DL (ref 2–4)
GLUCOSE SERPL-MCNC: 94 MG/DL (ref 65–100)
GLUCOSE UR STRIP.AUTO-MCNC: NEGATIVE MG/DL
HCT VFR BLD AUTO: 38.1 % (ref 35–47)
HGB BLD-MCNC: 11.9 G/DL (ref 11.5–16)
HGB UR QL STRIP: ABNORMAL
IMM GRANULOCYTES # BLD AUTO: 0 K/UL (ref 0–0.04)
IMM GRANULOCYTES NFR BLD AUTO: 0 % (ref 0–0.5)
KETONES UR QL STRIP.AUTO: 15 MG/DL
LEUKOCYTE ESTERASE UR QL STRIP.AUTO: NEGATIVE
LIPASE SERPL-CCNC: 74 U/L (ref 73–393)
LYMPHOCYTES # BLD: 1.3 K/UL (ref 0.8–3.5)
LYMPHOCYTES NFR BLD: 16 % (ref 12–49)
MCH RBC QN AUTO: 25.7 PG (ref 26–34)
MCHC RBC AUTO-ENTMCNC: 31.2 G/DL (ref 30–36.5)
MCV RBC AUTO: 82.3 FL (ref 80–99)
MONOCYTES # BLD: 0.6 K/UL (ref 0–1)
MONOCYTES NFR BLD: 8 % (ref 5–13)
MUCOUS THREADS URNS QL MICRO: ABNORMAL /LPF
NEUTS SEG # BLD: 6 K/UL (ref 1.8–8)
NEUTS SEG NFR BLD: 75 % (ref 32–75)
NITRITE UR QL STRIP.AUTO: NEGATIVE
NRBC # BLD: 0 K/UL (ref 0–0.01)
NRBC BLD-RTO: 0 PER 100 WBC
PH UR STRIP: 5.5 [PH] (ref 5–8)
PLATELET # BLD AUTO: 374 K/UL (ref 150–400)
PMV BLD AUTO: 9.7 FL (ref 8.9–12.9)
POTASSIUM SERPL-SCNC: 3.5 MMOL/L (ref 3.5–5.1)
PROT SERPL-MCNC: 7.9 G/DL (ref 6.4–8.2)
PROT UR STRIP-MCNC: NEGATIVE MG/DL
RBC # BLD AUTO: 4.63 M/UL (ref 3.8–5.2)
RBC #/AREA URNS HPF: ABNORMAL /HPF (ref 0–5)
SAMPLES BEING HELD,HOLD: NORMAL
SODIUM SERPL-SCNC: 139 MMOL/L (ref 136–145)
SP GR UR REFRACTOMETRY: 1.02 (ref 1–1.03)
UROBILINOGEN UR QL STRIP.AUTO: 0.2 EU/DL (ref 0.2–1)
WBC # BLD AUTO: 8 K/UL (ref 3.6–11)
WBC URNS QL MICRO: ABNORMAL /HPF (ref 0–4)

## 2020-12-27 PROCEDURE — 96374 THER/PROPH/DIAG INJ IV PUSH: CPT

## 2020-12-27 PROCEDURE — 99285 EMERGENCY DEPT VISIT HI MDM: CPT

## 2020-12-27 PROCEDURE — 74011250636 HC RX REV CODE- 250/636: Performed by: EMERGENCY MEDICINE

## 2020-12-27 PROCEDURE — 80053 COMPREHEN METABOLIC PANEL: CPT

## 2020-12-27 PROCEDURE — 74011250637 HC RX REV CODE- 250/637: Performed by: EMERGENCY MEDICINE

## 2020-12-27 PROCEDURE — 81001 URINALYSIS AUTO W/SCOPE: CPT

## 2020-12-27 PROCEDURE — 85025 COMPLETE CBC W/AUTO DIFF WBC: CPT

## 2020-12-27 PROCEDURE — 36415 COLL VENOUS BLD VENIPUNCTURE: CPT

## 2020-12-27 PROCEDURE — 83690 ASSAY OF LIPASE: CPT

## 2020-12-27 RX ORDER — ONDANSETRON 4 MG/1
4 TABLET, ORALLY DISINTEGRATING ORAL
Status: COMPLETED | OUTPATIENT
Start: 2020-12-27 | End: 2020-12-27

## 2020-12-27 RX ORDER — ONDANSETRON 2 MG/ML
4 INJECTION INTRAMUSCULAR; INTRAVENOUS
Status: COMPLETED | OUTPATIENT
Start: 2020-12-27 | End: 2020-12-27

## 2020-12-27 RX ORDER — ONDANSETRON 4 MG/1
4 TABLET, ORALLY DISINTEGRATING ORAL
Qty: 10 TAB | Refills: 0 | OUTPATIENT
Start: 2020-12-27 | End: 2021-07-03

## 2020-12-27 RX ADMIN — SODIUM CHLORIDE 1000 ML: 900 INJECTION, SOLUTION INTRAVENOUS at 21:08

## 2020-12-27 RX ADMIN — SODIUM CHLORIDE 1000 ML: 900 INJECTION, SOLUTION INTRAVENOUS at 19:58

## 2020-12-27 RX ADMIN — ONDANSETRON 4 MG: 4 TABLET, ORALLY DISINTEGRATING ORAL at 22:27

## 2020-12-27 RX ADMIN — ONDANSETRON 4 MG: 2 INJECTION INTRAMUSCULAR; INTRAVENOUS at 19:59

## 2020-12-28 ENCOUNTER — HOSPITAL ENCOUNTER (EMERGENCY)
Age: 30
Discharge: HOME | End: 2020-12-29
Attending: EMERGENCY MEDICINE
Payer: COMMERCIAL

## 2020-12-28 ENCOUNTER — APPOINTMENT (OUTPATIENT)
Dept: ULTRASOUND IMAGING | Age: 30
End: 2020-12-28
Attending: EMERGENCY MEDICINE
Payer: COMMERCIAL

## 2020-12-28 ENCOUNTER — VIRTUAL VISIT (OUTPATIENT)
Dept: PRIMARY CARE CLINIC | Age: 30
End: 2020-12-28
Payer: COMMERCIAL

## 2020-12-28 DIAGNOSIS — R19.7 DIARRHEA, UNSPECIFIED TYPE: Primary | ICD-10-CM

## 2020-12-28 DIAGNOSIS — G90.50 RSD (REFLEX SYMPATHETIC DYSTROPHY): ICD-10-CM

## 2020-12-28 DIAGNOSIS — K21.00 GASTROESOPHAGEAL REFLUX DISEASE WITH ESOPHAGITIS WITHOUT HEMORRHAGE: Primary | ICD-10-CM

## 2020-12-28 DIAGNOSIS — K80.50 BILIARY COLIC: ICD-10-CM

## 2020-12-28 DIAGNOSIS — R11.2 NON-INTRACTABLE VOMITING WITH NAUSEA, UNSPECIFIED VOMITING TYPE: ICD-10-CM

## 2020-12-28 DIAGNOSIS — R10.9 ABDOMINAL CRAMPS: ICD-10-CM

## 2020-12-28 LAB
BASOPHILS # BLD: 0 K/UL (ref 0–0.1)
BASOPHILS NFR BLD: 0 % (ref 0–1)
DIFFERENTIAL METHOD BLD: ABNORMAL
EOSINOPHIL # BLD: 0.2 K/UL (ref 0–0.4)
EOSINOPHIL NFR BLD: 2 % (ref 0–7)
ERYTHROCYTE [DISTWIDTH] IN BLOOD BY AUTOMATED COUNT: 16.4 % (ref 11.5–14.5)
HCT VFR BLD AUTO: 34 % (ref 35–47)
HGB BLD-MCNC: 10.8 G/DL (ref 11.5–16)
IMM GRANULOCYTES # BLD AUTO: 0 K/UL (ref 0–0.04)
IMM GRANULOCYTES NFR BLD AUTO: 0 % (ref 0–0.5)
LYMPHOCYTES # BLD: 1.5 K/UL (ref 0.8–3.5)
LYMPHOCYTES NFR BLD: 21 % (ref 12–49)
MCH RBC QN AUTO: 25.9 PG (ref 26–34)
MCHC RBC AUTO-ENTMCNC: 31.8 G/DL (ref 30–36.5)
MCV RBC AUTO: 81.5 FL (ref 80–99)
MONOCYTES # BLD: 0.7 K/UL (ref 0–1)
MONOCYTES NFR BLD: 10 % (ref 5–13)
NEUTS SEG # BLD: 4.5 K/UL (ref 1.8–8)
NEUTS SEG NFR BLD: 67 % (ref 32–75)
NRBC # BLD: 0 K/UL (ref 0–0.01)
NRBC BLD-RTO: 0 PER 100 WBC
PLATELET # BLD AUTO: 315 K/UL (ref 150–400)
PMV BLD AUTO: 9.2 FL (ref 8.9–12.9)
RBC # BLD AUTO: 4.17 M/UL (ref 3.8–5.2)
WBC # BLD AUTO: 6.9 K/UL (ref 3.6–11)

## 2020-12-28 PROCEDURE — 96375 TX/PRO/DX INJ NEW DRUG ADDON: CPT

## 2020-12-28 PROCEDURE — 83690 ASSAY OF LIPASE: CPT

## 2020-12-28 PROCEDURE — 80053 COMPREHEN METABOLIC PANEL: CPT

## 2020-12-28 PROCEDURE — 81001 URINALYSIS AUTO W/SCOPE: CPT

## 2020-12-28 PROCEDURE — 99284 EMERGENCY DEPT VISIT MOD MDM: CPT

## 2020-12-28 PROCEDURE — 96374 THER/PROPH/DIAG INJ IV PUSH: CPT

## 2020-12-28 PROCEDURE — 76700 US EXAM ABDOM COMPLETE: CPT

## 2020-12-28 PROCEDURE — 85025 COMPLETE CBC W/AUTO DIFF WBC: CPT

## 2020-12-28 PROCEDURE — 74011250636 HC RX REV CODE- 250/636: Performed by: EMERGENCY MEDICINE

## 2020-12-28 PROCEDURE — 36415 COLL VENOUS BLD VENIPUNCTURE: CPT

## 2020-12-28 PROCEDURE — 99213 OFFICE O/P EST LOW 20 MIN: CPT | Performed by: INTERNAL MEDICINE

## 2020-12-28 RX ORDER — ONDANSETRON 2 MG/ML
8 INJECTION INTRAMUSCULAR; INTRAVENOUS
Status: COMPLETED | OUTPATIENT
Start: 2020-12-28 | End: 2020-12-28

## 2020-12-28 RX ORDER — KETOROLAC TROMETHAMINE 30 MG/ML
30 INJECTION, SOLUTION INTRAMUSCULAR; INTRAVENOUS
Status: COMPLETED | OUTPATIENT
Start: 2020-12-28 | End: 2020-12-28

## 2020-12-28 RX ADMIN — KETOROLAC TROMETHAMINE 30 MG: 30 INJECTION, SOLUTION INTRAMUSCULAR at 23:34

## 2020-12-28 RX ADMIN — ONDANSETRON 8 MG: 2 INJECTION INTRAMUSCULAR; INTRAVENOUS at 23:34

## 2020-12-28 NOTE — Clinical Note
[address] 
[facility] 
[address] [address] [phone] Work/School Note Date: 12/28/2020 To Whom It May concern: 
 
Huan Santiago was seen and treated today in the emergency room by the following provider(s): 
Attending Provider: Ant Wilson MD. Huan Santiago is excused from work/school on 12/29/20 and 12/30/20. She is medically clear to return to work/school on 12/31/2020. Sincerely, Johnny Valadez MD

## 2020-12-28 NOTE — DISCHARGE INSTRUCTIONS
We hope that we have addressed all of your medical concerns. The examination and treatment you received in the Emergency Department were for an emergent problem and were not intended as complete care. It is important that you follow up with your healthcare provider(s) for ongoing care. If your symptoms worsen or do not improve as expected, and you are unable to reach your usual health care provider(s), you should return to the Emergency Department. Today's healthcare is undergoing tremendous change, and patient satisfaction surveys are one of the many tools to assess the quality of medical care. You may receive a survey from the [employer] organization regarding your experience in the Emergency Department. I hope that your experience has been completely positive, particularly the medical care that I provided. As such, please participate in the survey; anything less than excellent does not meet my expectations or intentions. [facility] and [facility] participate in nationally recognized quality of care measures. If your blood pressure is greater than 120/80, as reported below, we urge that you seek medical care to address the potential of high blood pressure, commonly known as hypertension. Hypertension can be hereditary or can be caused by certain medical conditions, pain, stress, or \"white coat syndrome. \"       Please make an appointment with your health care provider(s) for follow up of your Emergency Department visit. VITALS:   Patient Vitals for the past 8 hrs:   Temp Pulse Resp BP SpO2   12/27/20 1935 98.9 °F (37.2 °C) 97 16 (!) 157/90 100 %          Thank you for allowing us to provide you with medical care today. We realize that you have many choices for your emergency care needs. Please choose us in the future for any continued health care needs. Reese Denson, [address] [place] [address] [address] Office: [phone]            Recent Results (from the past 24 hour(s))   SAMPLES BEING HELD    Collection Time: 12/27/20  7:48 PM   Result Value Ref Range    SAMPLES BEING HELD 1RED 1BLUE 1LAV 1GRN     COMMENT        Add-on orders for these samples will be processed based on acceptable specimen integrity and analyte stability, which may vary by analyte. CBC WITH AUTOMATED DIFF    Collection Time: 12/27/20  7:49 PM   Result Value Ref Range    WBC 8.0 3.6 - 11.0 K/uL    RBC 4.63 3.80 - 5.20 M/uL    HGB 11.9 11.5 - 16.0 g/dL    HCT 38.1 35.0 - 47.0 %    MCV 82.3 80.0 - 99.0 FL    MCH 25.7 (L) 26.0 - 34.0 PG    MCHC 31.2 30.0 - 36.5 g/dL    RDW 16.6 (H) 11.5 - 14.5 %    PLATELET 427 705 - 477 K/uL    MPV 9.7 8.9 - 12.9 FL    NRBC 0.0 0  WBC    ABSOLUTE NRBC 0.00 0.00 - 0.01 K/uL    NEUTROPHILS 75 32 - 75 %    LYMPHOCYTES 16 12 - 49 %    MONOCYTES 8 5 - 13 %    EOSINOPHILS 1 0 - 7 %    BASOPHILS 0 0 - 1 %    IMMATURE GRANULOCYTES 0 0.0 - 0.5 %    ABS. NEUTROPHILS 6.0 1.8 - 8.0 K/UL    ABS. LYMPHOCYTES 1.3 0.8 - 3.5 K/UL    ABS. MONOCYTES 0.6 0.0 - 1.0 K/UL    ABS. EOSINOPHILS 0.1 0.0 - 0.4 K/UL    ABS. BASOPHILS 0.0 0.0 - 0.1 K/UL    ABS. IMM. GRANS. 0.0 0.00 - 0.04 K/UL    DF AUTOMATED     METABOLIC PANEL, COMPREHENSIVE    Collection Time: 12/27/20  7:49 PM   Result Value Ref Range    Sodium 139 136 - 145 mmol/L    Potassium 3.5 3.5 - 5.1 mmol/L    Chloride 103 97 - 108 mmol/L    CO2 26 21 - 32 mmol/L    Anion gap 10 5 - 15 mmol/L    Glucose 94 65 - 100 mg/dL    BUN 7 6 - 20 MG/DL    Creatinine 0.85 0.55 - 1.02 MG/DL    BUN/Creatinine ratio 8 (L) 12 - 20      GFR est AA >60 >60 ml/min/1.73m2    GFR est non-AA >60 >60 ml/min/1.73m2    Calcium 9.2 8.5 - 10.1 MG/DL    Bilirubin, total 0.6 0.2 - 1.0 MG/DL    ALT (SGPT) 14 12 - 78 U/L    AST (SGOT) 11 (L) 15 - 37 U/L    Alk.  phosphatase 86 45 - 117 U/L    Protein, total 7.9 6.4 - 8.2 g/dL    Albumin 3.7 3.5 - 5.0 g/dL    Globulin 4.2 (H) 2.0 - 4.0 g/dL    A-G Ratio 0.9 (L) 1.1 - 2.2     LIPASE    Collection Time: 12/27/20  7:49 PM   Result Value Ref Range    Lipase 74 73 - 393 U/L       No results found.

## 2020-12-28 NOTE — PROGRESS NOTES
Written by Jaci Olivia, as dictated by Dr. Jeronimo Valadez MD.    Daniel Martin is a 26 y.o. female. HPI  I was in the office while conducting this encounter. Consent:  She and/or her healthcare decision maker is aware that this patient-initiated Telehealth encounter is a billable service, with coverage as determined by her insurance carrier. She is aware that she may receive a bill and has provided verbal consent to proceed: Yes    This virtual visit was conducted via [address] Pursuant to the emergency declaration under the [address] [facility], 1135 waiver authority and the [employer] Act, this Virtual  Visit was conducted to reduce the patient's risk of exposure to COVID-19 and provide continuity of care for an established patient. Services were provided through a video synchronous discussion virtually to substitute for in-person clinic visit. Due to this being a TeleHealth evaluation, many elements of the physical examination are unable to be assessed. Pt presents virtually today to discuss nausea, emesis, and diarrhea. She has been trying to stay hydrated throughout all of this but still became dehydrated and went to the ED yesterday. She was severely dehydrated and was given IV fluids. She was sent home with Zofran, an she has been drink water all day today. With the Zofran today, she hs been able to keep the water down. She has not eaten anything yet today, so she has not had any diarrhea or emesis. She had [employer] tomato soup before all of this started, and she later found out that it has cream in it. She is lactose intolerant. She also gets very sick after she eats tomatoes. Denies SOB or HA. Her R leg hurts from nerve damage and has been getting warm then cold, then warm again. She has not been to PT for a few weeks, and her last appt was cancelled.  Dr. Christiana Villalta gave her a brace for her ankle, but she has not had f/u visits with him after that. Patient Active Problem List   Diagnosis Code    Moderate persistent asthma without complication H63.01    Anxiety F41.9        Current Outpatient Medications on File Prior to Visit   Medication Sig Dispense Refill    ondansetron (Zofran ODT) 4 mg disintegrating tablet Take 1 Tab by mouth every eight (8) hours as needed for Nausea. 10 Tab 0    montelukast (SINGULAIR) 10 mg tablet Take 1 Tab by mouth daily for 90 days. 90 Tab 0    traZODone (DESYREL) 50 mg tablet Take 1 Tab by mouth nightly for 90 days. 90 Tab 0    fluticasone propion-salmeteroL (Advair Diskus) 250-50 mcg/dose diskus inhaler TAKE 1 PUFF BY MOUTH EVERY 12 HOURS 1 Each 1    citalopram (CELEXA) 20 mg tablet Take 1 Tab by mouth daily for 90 days. 90 Tab 0    enoxaparin (LOVENOX) 40 mg/0.4 mL 40 mg by SubCUTAneous route two (2) times a day.  cyclobenzaprine (FLEXERIL) 10 mg tablet Take 10 mg by mouth three (3) times daily as needed.  albuterol (PROVENTIL HFA, VENTOLIN HFA, PROAIR HFA) 90 mcg/actuation inhaler INHALE 1 PUFF BY MOUTH EVERY 6 HOURS AS NEEDED FOR WHEEZING 18 Inhaler 0    promethazine (PHENERGAN) 25 mg tablet Take 1 Tab by mouth every eight (8) hours as needed for Nausea for up to 10 doses. 10 Tab 0    ascorbic acid, vitamin C, (VITAMIN C) 1,000 mg tablet Take 1,000 mg by mouth.  ergocalciferol (VITAMIN D2) 50,000 unit capsule Take 50,000 Units by mouth.  Biotin 2,500 mcg cap Take  by mouth.  vitamin e (E GEMS) 100 unit capsule Take  by mouth daily.  zinc oxide-cod liver oil (DESITIN) 40 % ointment Apply  to affected area as needed for Skin Irritation.  folic acid-vit P0-ZFN F86 2.2-25-1 mg tab Take  by mouth.  traZODone (DESYREL) 50 mg tablet Take 50 mg by mouth.        Current Facility-Administered Medications on File Prior to Visit   Medication Dose Route Frequency Provider Last Rate Last Admin    [COMPLETED] sodium chloride 0.9 % bolus infusion 1,000 mL  1,000 mL IntraVENous NOW Marylou Torres MD   Stopped at 12/27/20 2244    [COMPLETED] ondansetron (ZOFRAN) injection 4 mg  4 mg IntraVENous NOW Marylou Torres MD   4 mg at 12/27/20 1959    [COMPLETED] sodium chloride 0.9 % bolus infusion 1,000 mL  1,000 mL IntraVENous ONCE Marylou Torres MD   Stopped at 12/27/20 2244    [COMPLETED] ondansetron (ZOFRAN ODT) tablet 4 mg  4 mg Oral NOW Marylou Torres MD   4 mg at 12/27/20 2227       Allergies   Allergen Reactions    Aspirin Nausea and Vomiting    Nut - Unspecified Anaphylaxis    Peanut Anaphylaxis     And tree nuts    Penicillins Rash    Percocet [Oxycodone-Acetaminophen] Itching    Aspirin Other (comments)     Muscle spasms    Pcn [Penicillins] Unknown (comments)    Percocet [Oxycodone-Acetaminophen] Rash    Sulfa (Sulfonamide Antibiotics) Unknown (comments)     States it was a childhood reaction does not remember       Past Medical History:   Diagnosis Date    Arthritis     Asthma     Psychiatric disorder     depression and anxiety       No past surgical history on file. Family History   Problem Relation Age of Onset   [facility] Cancer Mother     Hypertension Father     Diabetes Father     Diabetes Brother     Asthma Maternal Grandmother     No Known Problems Paternal Grandmother     Hypertension Brother        Social History     Socioeconomic History    Marital status:      Spouse name: Not on file    Number of children: Not on file    Years of education: Not on file    Highest education level: Not on file   Occupational History    Not on file   Social Needs    Financial resource strain: Not on file    Food insecurity     Worry: Not on file     Inability: Not on file   [employer] needs     Medical: Not on file     Non-medical: Not on file   Tobacco Use    Smoking status: Never Smoker    Smokeless tobacco: Never Used   Substance and Sexual Activity    Alcohol use:  Yes Comment: occ/socially-    Drug use: Yes     Types: Marijuana     Comment: Recreational/Insomnia     Sexual activity: Yes     Partners: Female     Birth control/protection: None   Lifestyle    Physical activity     Days per week: Not on file     Minutes per session: Not on file    Stress: Not on file   Relationships    Social connections     Talks on phone: Not on file     Gets together: Not on file     Attends [employer] service: Not on file     Active member of club or organization: Not on file     Attends meetings of clubs or organizations: Not on file     Relationship status: Not on file    Intimate partner violence     Fear of current or ex partner: Not on file     Emotionally abused: Not on file     Physically abused: Not on file     Forced sexual activity: Not on file   Other Topics Concern    Not on file   Social History Narrative    ** Merged History Encounter **            Admission on 12/27/2020, Discharged on 12/27/2020   Component Date Value Ref Range Status    SAMPLES BEING HELD 12/27/2020 1RED 1BLUE 1LAV 1GRN   Final    COMMENT 12/27/2020 Add-on orders for these samples will be processed based on acceptable specimen integrity and analyte stability, which may vary by analyte.     Final    WBC 12/27/2020 8.0  3.6 - 11.0 K/uL Final    RBC 12/27/2020 4.63  3.80 - 5.20 M/uL Final    HGB 12/27/2020 11.9  11.5 - 16.0 g/dL Final    HCT 12/27/2020 38.1  35.0 - 47.0 % Final    MCV 12/27/2020 82.3  80.0 - 99.0 FL Final    MCH 12/27/2020 25.7* 26.0 - 34.0 PG Final    MCHC 12/27/2020 31.2  30.0 - 36.5 g/dL Final    RDW 12/27/2020 16.6* 11.5 - 14.5 % Final    PLATELET 75/35/4330 907  150 - 400 K/uL Final    MPV 12/27/2020 9.7  8.9 - 12.9 FL Final    NRBC 12/27/2020 0.0  0  WBC Final    ABSOLUTE NRBC 12/27/2020 0.00  0.00 - 0.01 K/uL Final    NEUTROPHILS 12/27/2020 75  32 - 75 % Final    LYMPHOCYTES 12/27/2020 16  12 - 49 % Final    MONOCYTES 12/27/2020 8  5 - 13 % Final    EOSINOPHILS 12/27/2020 1  0 - 7 % Final    BASOPHILS 12/27/2020 0  0 - 1 % Final    IMMATURE GRANULOCYTES 12/27/2020 0  0.0 - 0.5 % Final    ABS. NEUTROPHILS 12/27/2020 6.0  1.8 - 8.0 K/UL Final    ABS. LYMPHOCYTES 12/27/2020 1.3  0.8 - 3.5 K/UL Final    ABS. MONOCYTES 12/27/2020 0.6  0.0 - 1.0 K/UL Final    ABS. EOSINOPHILS 12/27/2020 0.1  0.0 - 0.4 K/UL Final    ABS. BASOPHILS 12/27/2020 0.0  0.0 - 0.1 K/UL Final    ABS. IMM. GRANS. 12/27/2020 0.0  0.00 - 0.04 K/UL Final    DF 12/27/2020 AUTOMATED    Final    Sodium 12/27/2020 139  136 - 145 mmol/L Final    Potassium 12/27/2020 3.5  3.5 - 5.1 mmol/L Final    Chloride 12/27/2020 103  97 - 108 mmol/L Final    CO2 12/27/2020 26  21 - 32 mmol/L Final    Anion gap 12/27/2020 10  5 - 15 mmol/L Final    Glucose 12/27/2020 94  65 - 100 mg/dL Final    BUN 12/27/2020 7  6 - 20 MG/DL Final    Creatinine 12/27/2020 0.85  0.55 - 1.02 MG/DL Final    BUN/Creatinine ratio 12/27/2020 8* 12 - 20   Final    GFR est AA 12/27/2020 >60  >60 ml/min/1.73m2 Final    GFR est non-AA 12/27/2020 >60  >60 ml/min/1.73m2 Final    Estimated GFR is calculated using the IDMS-traceable Modification of Diet in Renal Disease (MDRD) Study equation, reported for both  Americans (GFRAA) and non- Americans (GFRNA), and normalized to 1.73m2 body surface area. The physician must decide which value applies to the patient.  Calcium 12/27/2020 9.2  8.5 - 10.1 MG/DL Final    Bilirubin, total 12/27/2020 0.6  0.2 - 1.0 MG/DL Final    ALT (SGPT) 12/27/2020 14  12 - 78 U/L Final    AST (SGOT) 12/27/2020 11* 15 - 37 U/L Final    Alk.  phosphatase 12/27/2020 86  45 - 117 U/L Final    Protein, total 12/27/2020 7.9  6.4 - 8.2 g/dL Final    Albumin 12/27/2020 3.7  3.5 - 5.0 g/dL Final    Globulin 12/27/2020 4.2* 2.0 - 4.0 g/dL Final    A-G Ratio 12/27/2020 0.9* 1.1 - 2.2   Final    Lipase 12/27/2020 74  73 - 393 U/L Final    Color 12/27/2020 YELLOW/STRAW    Final    Color Reference Range: Straw, Yellow or Dark Yellow    Appearance 12/27/2020 CLEAR  CLEAR   Final    Specific gravity 12/27/2020 1.025  1.003 - 1.030   Final    pH (UA) 12/27/2020 5.5  5.0 - 8.0   Final    Protein 12/27/2020 Negative  NEG mg/dL Final    Glucose 12/27/2020 Negative  NEG mg/dL Final    Ketone 12/27/2020 15* NEG mg/dL Final    Bilirubin 12/27/2020 Negative  NEG   Final    Blood 12/27/2020 TRACE* NEG   Final    Urobilinogen 12/27/2020 0.2  0.2 - 1.0 EU/dL Final    Nitrites 12/27/2020 Negative  NEG   Final    Leukocyte Esterase 12/27/2020 Negative  NEG   Final    WBC 12/27/2020 5-10  0 - 4 /hpf Final    RBC 12/27/2020 0-5  0 - 5 /hpf Final    Epithelial cells 12/27/2020 MODERATE* FEW /lpf Final    Epithelial cell category consists of squamous cells and /or transitional urothelial cells. Renal tubular cells, if present, are separately identified as such.  Bacteria 12/27/2020 Negative  NEG /hpf Final    Mucus 12/27/2020 3+* NEG /lpf Final     Review of Systems   Constitutional: Negative for malaise/fatigue and weight loss. HENT: Negative for congestion and sore throat. Eyes: Negative for blurred vision. Respiratory: Negative for cough and shortness of breath. Cardiovascular: Negative for chest pain and leg swelling. Gastrointestinal: Positive for abdominal pain, diarrhea, nausea and vomiting. Negative for constipation and heartburn. Genitourinary: Negative for frequency and urgency. Musculoskeletal: Negative for back pain, joint pain and myalgias. Neurological: Positive for sensory change (R leg warmth and cold). Negative for dizziness and headaches. Psychiatric/Behavioral: Negative for depression. The patient is not nervous/anxious and does not have insomnia. There were no vitals taken for this visit. Physical Exam  Nursing note reviewed. Constitutional:       Appearance: Normal appearance. She is well-developed and well-groomed.    HENT:      Head: Normocephalic and atraumatic. Nose: No congestion. Eyes:      General:         Right eye: No discharge. Left eye: No discharge. Pulmonary:      Effort: Pulmonary effort is normal.      Breath sounds: No wheezing. Neurological:      Mental Status: She is alert and oriented to person, place, and time. Psychiatric:         Mood and Affect: Mood normal.         Behavior: Behavior normal.       ASSESSMENT and PLAN    ICD-10-CM ICD-9-CM    1. Diarrhea, unspecified type  R19.7 787.91 I instructed her to try eating mild foods and staying hydrated. 2. Non-intractable vomiting with nausea, unspecified vomiting type  R11.2 787.01  Instructed her to continue with drinking plenty of liquids and taking Zofran PRN. If she spikes a fever, she should go get COVID tested, but given her history this sounds more diet-related. 3. Abdominal cramps  R10.9 789.00 Her sx sound like tomato allergy, and I instructed her to eliminate tomatoes from her diet. She should also continue to avoid dairy. 4. RSD (reflex sympathetic dystrophy)  G90.50 337.20 I explained that her nerve pain and sx in her R leg are from RSD. If it gets worse, I will refer her back to the neurologist.        This plan was reviewed with the patient and patient agrees. All questions were answered. This scribe documentation was reviewed by me and accurately reflects the examination and decisions made by me.

## 2020-12-28 NOTE — ED PROVIDER NOTES
HPI   27 yo F presents with nausea, vomiting (nonbilious) with small streak of blood x1 episode. Loose stool, no bloody or black stools. Denies fever, chills, dysuria, hematuria, vaginal bleeding or discharge. C/o lower abdominal pain. No known sick contacts, no recent travel, no recent abx use. Took activated charcoal and pedialyte at home today. LMP-last month  Past Medical History:   Diagnosis Date    Arthritis     Asthma     Psychiatric disorder     depression and anxiety       History reviewed. No pertinent surgical history.       Family History:   Problem Relation Age of Onset   [facility] Cancer Mother     Hypertension Father     Diabetes Father     Diabetes Brother     Asthma Maternal Grandmother     No Known Problems Paternal Grandmother     Hypertension Brother        Social History     Socioeconomic History    Marital status:      Spouse name: Not on file    Number of children: Not on file    Years of education: Not on file    Highest education level: Not on file   Occupational History    Not on file   Social Needs    Financial resource strain: Not on file    Food insecurity     Worry: Not on file     Inability: Not on file   [employer] needs     Medical: Not on file     Non-medical: Not on file   Tobacco Use    Smoking status: Never Smoker    Smokeless tobacco: Never Used   Substance and Sexual Activity    Alcohol use: Yes     Comment: occ/socially-    Drug use: Yes     Types: Marijuana     Comment: Recreational/Insomnia     Sexual activity: Yes     Partners: Female     Birth control/protection: None   Lifestyle    Physical activity     Days per week: Not on file     Minutes per session: Not on file    Stress: Not on file   Relationships    Social connections     Talks on phone: Not on file     Gets together: Not on file     Attends [employer] service: Not on file     Active member of club or organization: Not on file     Attends meetings of clubs or organizations: Not on file Relationship status: Not on file    Intimate partner violence     Fear of current or ex partner: Not on file     Emotionally abused: Not on file     Physically abused: Not on file     Forced sexual activity: Not on file   Other Topics Concern    Not on file   Social History Narrative    ** Merged History Encounter **              ALLERGIES: Aspirin, Nut - unspecified, Peanut, Penicillins, Percocet [oxycodone-acetaminophen], Aspirin, Pcn [penicillins], Percocet [oxycodone-acetaminophen], and Sulfa (sulfonamide antibiotics)    Review of Systems   Constitutional: Negative for chills and fever. HENT: Negative for congestion and sore throat. Respiratory: Negative for cough and shortness of breath. Cardiovascular: Negative for chest pain. Gastrointestinal: Positive for abdominal pain, diarrhea, nausea and vomiting. Genitourinary: Negative for dysuria and hematuria. Musculoskeletal: Negative for neck pain and neck stiffness. All other systems reviewed and are negative.       Vitals:    12/27/20 1935   BP: (!) 157/90   Pulse: 97   Resp: 16   Temp: 98.9 °F (37.2 °C)   SpO2: 100%   Weight: 98.1 kg (216 lb 4.3 oz)   Height: 5' 7\" (1.702 m)            Physical Exam   Physical Examination: General appearance - alert, well appearing, and in no distress, oriented to person, place, and time and normal appearing weight  Eyes - pupils equal and reactive, extraocular eye movements intact  Neck - supple, no significant adenopathy  Chest - clear to auscultation, no wheezes, rales or rhonchi, symmetric air entry  Heart - normal rate, regular rhythm, normal S1, S2, no murmurs, rubs, clicks or gallops  Abdomen - soft, nontender, nondistended, no masses or organomegaly  Back exam - full range of motion, no tenderness, palpable spasm or pain on motion  Neurological - alert, oriented, normal speech, no focal findings or movement disorder noted  Musculoskeletal - no joint tenderness, deformity or swelling  Extremities - peripheral pulses normal, no pedal edema, no clubbing or cyanosis  Skin - normal coloration and turgor, no rashes, no suspicious skin lesions noted  MDM  Number of Diagnoses or Management Options     Amount and/or Complexity of Data Reviewed  Clinical lab tests: ordered and reviewed  Decide to obtain previous medical records or to obtain history from someone other than the patient: yes  Review and summarize past medical records: yes    Patient Progress  Patient progress: improved         Procedures  9:32 PM  Abdomen soft nontender. Patient states symptoms have resolved. Denies abdominal pain. Will p.o. challenge and if does well will DC with PCP follow-up or return to ED for worsening symptoms. Reviewed appendicitis precautions with patients and reasons to return to ED.

## 2020-12-29 VITALS
DIASTOLIC BLOOD PRESSURE: 83 MMHG | SYSTOLIC BLOOD PRESSURE: 133 MMHG | OXYGEN SATURATION: 99 % | RESPIRATION RATE: 18 BRPM | TEMPERATURE: 98.2 F | HEART RATE: 87 BPM

## 2020-12-29 LAB
ALBUMIN SERPL-MCNC: 3.5 G/DL (ref 3.5–5)
ALBUMIN/GLOB SERPL: 1 {RATIO} (ref 1.1–2.2)
ALP SERPL-CCNC: 76 U/L (ref 45–117)
ALT SERPL-CCNC: 13 U/L (ref 12–78)
ANION GAP SERPL CALC-SCNC: 8 MMOL/L (ref 5–15)
APPEARANCE UR: CLEAR
AST SERPL-CCNC: 12 U/L (ref 15–37)
BACTERIA URNS QL MICRO: NEGATIVE /HPF
BILIRUB SERPL-MCNC: 0.7 MG/DL (ref 0.2–1)
BILIRUB UR QL: NEGATIVE
BUN SERPL-MCNC: 7 MG/DL (ref 6–20)
BUN/CREAT SERPL: 8 (ref 12–20)
CALCIUM SERPL-MCNC: 8.9 MG/DL (ref 8.5–10.1)
CHLORIDE SERPL-SCNC: 105 MMOL/L (ref 97–108)
CO2 SERPL-SCNC: 27 MMOL/L (ref 21–32)
COLOR UR: ABNORMAL
CREAT SERPL-MCNC: 0.86 MG/DL (ref 0.55–1.02)
EPITH CASTS URNS QL MICRO: ABNORMAL /LPF
GLOBULIN SER CALC-MCNC: 3.6 G/DL (ref 2–4)
GLUCOSE SERPL-MCNC: 95 MG/DL (ref 65–100)
GLUCOSE UR STRIP.AUTO-MCNC: NEGATIVE MG/DL
HCG UR QL: NEGATIVE
HGB UR QL STRIP: ABNORMAL
KETONES UR QL STRIP.AUTO: 15 MG/DL
LEUKOCYTE ESTERASE UR QL STRIP.AUTO: ABNORMAL
LIPASE SERPL-CCNC: 65 U/L (ref 73–393)
MUCOUS THREADS URNS QL MICRO: ABNORMAL /LPF
NITRITE UR QL STRIP.AUTO: NEGATIVE
PH UR STRIP: 5.5 [PH] (ref 5–8)
POTASSIUM SERPL-SCNC: 3.3 MMOL/L (ref 3.5–5.1)
PROT SERPL-MCNC: 7.1 G/DL (ref 6.4–8.2)
PROT UR STRIP-MCNC: NEGATIVE MG/DL
RBC #/AREA URNS HPF: ABNORMAL /HPF (ref 0–5)
SODIUM SERPL-SCNC: 140 MMOL/L (ref 136–145)
SP GR UR REFRACTOMETRY: 1.02 (ref 1–1.03)
UA: UC IF INDICATED,UAUC: ABNORMAL
UROBILINOGEN UR QL STRIP.AUTO: 0.2 EU/DL (ref 0.2–1)
WBC URNS QL MICRO: ABNORMAL /HPF (ref 0–4)

## 2020-12-29 PROCEDURE — 81025 URINE PREGNANCY TEST: CPT

## 2020-12-29 RX ORDER — DICYCLOMINE HYDROCHLORIDE 20 MG/1
20 TABLET ORAL EVERY 6 HOURS
Qty: 20 TAB | Refills: 0 | OUTPATIENT
Start: 2020-12-29 | End: 2021-07-03

## 2020-12-29 RX ORDER — OMEPRAZOLE 40 MG/1
40 CAPSULE, DELAYED RELEASE ORAL DAILY
Qty: 30 CAP | Refills: 0 | Status: SHIPPED | OUTPATIENT
Start: 2020-12-29 | End: 2022-05-08 | Stop reason: SDUPTHER

## 2020-12-29 NOTE — ED PROVIDER NOTES
The patient is a 27-year-old female with a past medical history significant for arthritis, asthma, anxiety and depression who presents to the ED with a complaint of epigastric pain that radiates to right upper quadrant area that began approximately 2 days ago, severity 5 out of 10, constant, accompanied by nausea, abdominal cramps and watery nonbloody diarrhea. The patient was seen and evaluated for the same symptoms 24 hours ago without any significant findings or discomfort at this time. He denies any fever, chills, sore throat, cough or congestion, headache, neck or back pain, chest pain, shortness of breath, dysuria, vaginal discharge or bleeding, extremity weakness or numbness, sick contact, skin rash, recent travel, prior history of the same. Past Medical History:   Diagnosis Date    Arthritis     Asthma     Psychiatric disorder     depression and anxiety       No past surgical history on file.       Family History:   Problem Relation Age of Onset   [facility] Cancer Mother     Hypertension Father     Diabetes Father     Diabetes Brother     Asthma Maternal Grandmother     No Known Problems Paternal Grandmother     Hypertension Brother        Social History     Socioeconomic History    Marital status:      Spouse name: Not on file    Number of children: Not on file    Years of education: Not on file    Highest education level: Not on file   Occupational History    Not on file   Social Needs    Financial resource strain: Not on file    Food insecurity     Worry: Not on file     Inability: Not on file   [employer] needs     Medical: Not on file     Non-medical: Not on file   Tobacco Use    Smoking status: Never Smoker    Smokeless tobacco: Never Used   Substance and Sexual Activity    Alcohol use: Yes     Comment: occ/socially-    Drug use: Yes     Types: Marijuana     Comment: Recreational/Insomnia     Sexual activity: Yes     Partners: Female     Birth control/protection: None   Lifestyle    Physical activity     Days per week: Not on file     Minutes per session: Not on file    Stress: Not on file   Relationships    Social connections     Talks on phone: Not on file     Gets together: Not on file     Attends [employer] service: Not on file     Active member of club or organization: Not on file     Attends meetings of clubs or organizations: Not on file     Relationship status: Not on file    Intimate partner violence     Fear of current or ex partner: Not on file     Emotionally abused: Not on file     Physically abused: Not on file     Forced sexual activity: Not on file   Other Topics Concern    Not on file   Social History Narrative    ** Merged History Encounter **              ALLERGIES: Aspirin, Nut - unspecified, Peanut, Penicillins, Percocet [oxycodone-acetaminophen], Aspirin, Pcn [penicillins], Percocet [oxycodone-acetaminophen], and Sulfa (sulfonamide antibiotics)    Review of Systems   All other systems reviewed and are negative. Vitals:    12/28/20 2305   BP: 132/84   Pulse: 87   Resp: 18   Temp: 98.2 °F (36.8 °C)   SpO2: 98%            Physical Exam  Vitals signs and nursing note reviewed. Exam conducted with a chaperone present. CONSTITUTIONAL: Well-appearing; well-nourished; in no apparent distress; very anxious and restless  HEAD: Normocephalic; atraumatic  EYES: PERRL; EOM intact; conjunctiva and sclera are clear bilaterally. ENT: No rhinorrhea; normal pharynx with no tonsillar hypertrophy; mucous membranes pink/moist, no erythema, no exudate. NECK: Supple; non-tender; no cervical lymphadenopathy  CARD: Normal S1, S2; no murmurs, rubs, or gallops. Regular rate and rhythm. RESP: Normal respiratory effort; breath sounds clear and equal bilaterally; no wheezes, rhonchi, or rales.   ABD: Normal bowel sounds; non-distended; epigastric and right upper quadrant tenderness without any rebound or guarding; no palpable organomegaly, no masses, no bruits. Back Exam: Normal inspection; no vertebral point tenderness, no CVA tenderness. Normal range of motion. EXT: Normal ROM in all four extremities; non-tender to palpation; no swelling or deformity; distal pulses are normal, no edema. SKIN: Warm; dry; no rash. NEURO:Alert and oriented x 3, coherent, PARIS-XII grossly intact, sensory and motor are non-focal.        MDM  Number of Diagnoses or Management Options  Diagnosis management comments: Assessment: 25-year-old female who presents to the ED with epigastric and right upper abdominal abdominal pain accompanied by nausea, abdominal cramps and diarrhea. The patient evaluation for biliary colic, pancreatitis, gastritis, colitis-presumed infectious and rule out electrolyte normality and dehydration    Plan: Lab/IV fluid/antiemetic and analgesia/ultrasound abdomen/Pepcid serial exam/ / Monitor and Reevaluate. Amount and/or Complexity of Data Reviewed  Clinical lab tests: ordered and reviewed  Tests in the radiology section of CPT®: ordered and reviewed  Tests in the medicine section of CPT®: reviewed and ordered  Discussion of test results with the performing providers: yes  Decide to obtain previous medical records or to obtain history from someone other than the patient: yes  Obtain history from someone other than the patient: yes  Review and summarize past medical records: yes  Discuss the patient with other providers: yes  Independent visualization of images, tracings, or specimens: yes    Risk of Complications, Morbidity, and/or Mortality  Presenting problems: moderate  Diagnostic procedures: moderate  Management options: moderate    Patient Progress  Patient progress: stable         Procedures    Progress Note:   Pt has been reexamined by Brian Ballard MD. Pt is feeling much better. Symptoms have improved. All available results have been reviewed with pt and any available family. Pt understands sx, dx, and tx in ED.  Care plan has been outlined and questions have been answered. Pt is ready to go home. Will send home on GERD/biliary colic need abdominal pain instruction. Prescription of naproxen and Prilosec. Outpatient referral with PCP/GI  for reevaluation and further treatment as needed. Written by Artemio Miller MD,12:24 AM    .   .

## 2020-12-29 NOTE — ED NOTES
Pt ambulatory out of ED with discharge instructions and prescriptions in hand given by Dr. Timmy Judge; pt verbalized understanding of discharge paperwork and time allotted for questions. VSS. Pt alert and oriented.

## 2021-01-05 ENCOUNTER — APPOINTMENT (OUTPATIENT)
Dept: CT IMAGING | Age: 31
End: 2021-01-05
Attending: EMERGENCY MEDICINE
Payer: COMMERCIAL

## 2021-01-05 ENCOUNTER — HOSPITAL ENCOUNTER (EMERGENCY)
Age: 31
Discharge: HOME | End: 2021-01-05
Attending: EMERGENCY MEDICINE
Payer: COMMERCIAL

## 2021-01-05 VITALS
HEART RATE: 92 BPM | TEMPERATURE: 98 F | RESPIRATION RATE: 19 BRPM | OXYGEN SATURATION: 98 % | DIASTOLIC BLOOD PRESSURE: 82 MMHG | SYSTOLIC BLOOD PRESSURE: 143 MMHG | WEIGHT: 220.46 LBS | HEIGHT: 67 IN | BODY MASS INDEX: 34.6 KG/M2

## 2021-01-05 DIAGNOSIS — D50.9 HYPOCHROMIC ANEMIA: ICD-10-CM

## 2021-01-05 DIAGNOSIS — R10.31 ABDOMINAL PAIN, RIGHT LOWER QUADRANT: Primary | ICD-10-CM

## 2021-01-05 LAB
ALBUMIN SERPL-MCNC: 3.4 G/DL (ref 3.5–5)
ALBUMIN/GLOB SERPL: 1 {RATIO} (ref 1.1–2.2)
ALP SERPL-CCNC: 73 U/L (ref 45–117)
ALT SERPL-CCNC: 13 U/L (ref 12–78)
ANION GAP SERPL CALC-SCNC: 6 MMOL/L (ref 5–15)
APPEARANCE UR: CLEAR
AST SERPL-CCNC: 10 U/L (ref 15–37)
BACTERIA URNS QL MICRO: NEGATIVE /HPF
BASOPHILS # BLD: 0.1 K/UL (ref 0–0.1)
BASOPHILS NFR BLD: 1 % (ref 0–1)
BILIRUB SERPL-MCNC: 0.2 MG/DL (ref 0.2–1)
BILIRUB UR QL: NEGATIVE
BUN SERPL-MCNC: 12 MG/DL (ref 6–20)
BUN/CREAT SERPL: 14 (ref 12–20)
CALCIUM SERPL-MCNC: 8.7 MG/DL (ref 8.5–10.1)
CHLORIDE SERPL-SCNC: 106 MMOL/L (ref 97–108)
CO2 SERPL-SCNC: 30 MMOL/L (ref 21–32)
COLOR UR: ABNORMAL
CREAT SERPL-MCNC: 0.83 MG/DL (ref 0.55–1.02)
DIFFERENTIAL METHOD BLD: ABNORMAL
EOSINOPHIL # BLD: 0.5 K/UL (ref 0–0.4)
EOSINOPHIL NFR BLD: 7 % (ref 0–7)
EPITH CASTS URNS QL MICRO: ABNORMAL /LPF
ERYTHROCYTE [DISTWIDTH] IN BLOOD BY AUTOMATED COUNT: 16.7 % (ref 11.5–14.5)
GLOBULIN SER CALC-MCNC: 3.5 G/DL (ref 2–4)
GLUCOSE SERPL-MCNC: 85 MG/DL (ref 65–100)
GLUCOSE UR STRIP.AUTO-MCNC: NEGATIVE MG/DL
HCG UR QL: NEGATIVE
HCT VFR BLD AUTO: 34.2 % (ref 35–47)
HGB BLD-MCNC: 10.5 G/DL (ref 11.5–16)
HGB UR QL STRIP: NEGATIVE
IMM GRANULOCYTES # BLD AUTO: 0 K/UL (ref 0–0.04)
IMM GRANULOCYTES NFR BLD AUTO: 0 % (ref 0–0.5)
KETONES UR QL STRIP.AUTO: ABNORMAL MG/DL
LEUKOCYTE ESTERASE UR QL STRIP.AUTO: NEGATIVE
LYMPHOCYTES # BLD: 1.9 K/UL (ref 0.8–3.5)
LYMPHOCYTES NFR BLD: 25 % (ref 12–49)
MCH RBC QN AUTO: 25.9 PG (ref 26–34)
MCHC RBC AUTO-ENTMCNC: 30.7 G/DL (ref 30–36.5)
MCV RBC AUTO: 84.2 FL (ref 80–99)
MONOCYTES # BLD: 0.6 K/UL (ref 0–1)
MONOCYTES NFR BLD: 8 % (ref 5–13)
MUCOUS THREADS URNS QL MICRO: ABNORMAL /LPF
NEUTS SEG # BLD: 4.4 K/UL (ref 1.8–8)
NEUTS SEG NFR BLD: 59 % (ref 32–75)
NITRITE UR QL STRIP.AUTO: NEGATIVE
NRBC # BLD: 0 K/UL (ref 0–0.01)
NRBC BLD-RTO: 0 PER 100 WBC
PH UR STRIP: 6 [PH] (ref 5–8)
PLATELET # BLD AUTO: 378 K/UL (ref 150–400)
PMV BLD AUTO: 9.4 FL (ref 8.9–12.9)
POTASSIUM SERPL-SCNC: 3.7 MMOL/L (ref 3.5–5.1)
PROT SERPL-MCNC: 6.9 G/DL (ref 6.4–8.2)
PROT UR STRIP-MCNC: NEGATIVE MG/DL
RBC # BLD AUTO: 4.06 M/UL (ref 3.8–5.2)
RBC #/AREA URNS HPF: ABNORMAL /HPF (ref 0–5)
SODIUM SERPL-SCNC: 142 MMOL/L (ref 136–145)
SP GR UR REFRACTOMETRY: 1.02 (ref 1–1.03)
UR CULT HOLD, URHOLD: NORMAL
UROBILINOGEN UR QL STRIP.AUTO: 0.2 EU/DL (ref 0.2–1)
WBC # BLD AUTO: 7.5 K/UL (ref 3.6–11)
WBC URNS QL MICRO: ABNORMAL /HPF (ref 0–4)

## 2021-01-05 PROCEDURE — 80053 COMPREHEN METABOLIC PANEL: CPT

## 2021-01-05 PROCEDURE — 99284 EMERGENCY DEPT VISIT MOD MDM: CPT

## 2021-01-05 PROCEDURE — 74177 CT ABD & PELVIS W/CONTRAST: CPT

## 2021-01-05 PROCEDURE — 81001 URINALYSIS AUTO W/SCOPE: CPT

## 2021-01-05 PROCEDURE — 85025 COMPLETE CBC W/AUTO DIFF WBC: CPT

## 2021-01-05 PROCEDURE — 74011000636 HC RX REV CODE- 636: Performed by: EMERGENCY MEDICINE

## 2021-01-05 PROCEDURE — 81025 URINE PREGNANCY TEST: CPT

## 2021-01-05 PROCEDURE — 74011000258 HC RX REV CODE- 258: Performed by: EMERGENCY MEDICINE

## 2021-01-05 PROCEDURE — 36415 COLL VENOUS BLD VENIPUNCTURE: CPT

## 2021-01-05 PROCEDURE — 74011250637 HC RX REV CODE- 250/637: Performed by: EMERGENCY MEDICINE

## 2021-01-05 RX ORDER — DICYCLOMINE HYDROCHLORIDE 10 MG/1
10 CAPSULE ORAL 3 TIMES DAILY
Qty: 21 CAP | Refills: 0 | Status: SHIPPED | OUTPATIENT
Start: 2021-01-05 | End: 2021-01-12

## 2021-01-05 RX ORDER — SODIUM CHLORIDE 9 MG/ML
10 INJECTION INTRAMUSCULAR; INTRAVENOUS; SUBCUTANEOUS ONCE
Status: COMPLETED | OUTPATIENT
Start: 2021-01-05 | End: 2021-01-05

## 2021-01-05 RX ORDER — TRAMADOL HYDROCHLORIDE 50 MG/1
50 TABLET ORAL
Status: COMPLETED | OUTPATIENT
Start: 2021-01-05 | End: 2021-01-05

## 2021-01-05 RX ORDER — SODIUM CHLORIDE 9 MG/ML
50 INJECTION, SOLUTION INTRAVENOUS
Status: COMPLETED | OUTPATIENT
Start: 2021-01-05 | End: 2021-01-05

## 2021-01-05 RX ADMIN — TRAMADOL HYDROCHLORIDE 50 MG: 50 TABLET, FILM COATED ORAL at 19:14

## 2021-01-05 RX ADMIN — SODIUM CHLORIDE 50 ML: 900 INJECTION, SOLUTION INTRAVENOUS at 19:54

## 2021-01-05 RX ADMIN — SODIUM CHLORIDE 10 ML: 9 INJECTION INTRAMUSCULAR; INTRAVENOUS; SUBCUTANEOUS at 19:55

## 2021-01-05 RX ADMIN — IOPAMIDOL 100 ML: 755 INJECTION, SOLUTION INTRAVENOUS at 19:54

## 2021-01-05 NOTE — ED PROVIDER NOTES
41-year-old female with a history of arthritis, asthma,, and depression is here with right little lower quadrant pain that started a few days ago. She has had right-sided abdominal pain for a few weeks and actually came in for a work-up previously. She said they gave an ultrasound that did not reveal a cause and they gave her some Zofran and something to relax her stomach muscles. She says that upper quadrant pain has gone, but now she has lower pain. No bleeding currently, but she was menstruating a few days ago and she thought it could be related to that. Now that she is no longer menstruating and she still has the pain, she came for further evaluation. No vaginal discharge and no pelvic pain. She is only had 1 bowel movement today and normally she goes 2 or 3 times. No nausea or vomiting. No fever. No chest pain or cough or trouble breathing. At one point she had some back pain and so she thought she might of had a urinary tract infection, but after drinking some cranberry juice the symptoms went away.            Past Medical History:   Diagnosis Date    Arthritis     Asthma     Psychiatric disorder     depression and anxiety       Past Surgical History:   Procedure Laterality Date    HX ORTHOPAEDIC      bilateral ankle surgery         Family History:   Problem Relation Age of Onset    Cancer Mother     Hypertension Father     Diabetes Father     Diabetes Brother     Asthma Maternal Grandmother     No Known Problems Paternal Grandmother     Hypertension Brother        Social History     Socioeconomic History    Marital status:      Spouse name: Not on file    Number of children: Not on file    Years of education: Not on file    Highest education level: Not on file   Occupational History    Not on file   Social Needs    Financial resource strain: Not on file    Food insecurity     Worry: Not on file     Inability: Not on file    Transportation needs     Medical: Not on file Non-medical: Not on file   Tobacco Use    Smoking status: Never Smoker    Smokeless tobacco: Never Used   Substance and Sexual Activity    Alcohol use: Yes     Comment: occ/socially-    Drug use: Yes     Types: Marijuana     Comment: Recreational/Insomnia     Sexual activity: Yes     Partners: Female     Birth control/protection: None   Lifestyle    Physical activity     Days per week: Not on file     Minutes per session: Not on file    Stress: Not on file   Relationships    Social connections     Talks on phone: Not on file     Gets together: Not on file     Attends Judaism service: Not on file     Active member of club or organization: Not on file     Attends meetings of clubs or organizations: Not on file     Relationship status: Not on file    Intimate partner violence     Fear of current or ex partner: Not on file     Emotionally abused: Not on file     Physically abused: Not on file     Forced sexual activity: Not on file   Other Topics Concern    Not on file   Social History Narrative    ** Merged History Encounter **              ALLERGIES: Aspirin, Nut - unspecified, Peanut, Penicillins, Percocet [oxycodone-acetaminophen], Aspirin, Pcn [penicillins], Percocet [oxycodone-acetaminophen], and Sulfa (sulfonamide antibiotics)    Review of Systems   Constitutional: Negative for fever. HENT: Negative for trouble swallowing. Eyes: Negative for visual disturbance. Respiratory: Negative for cough. Cardiovascular: Negative for chest pain. Gastrointestinal: Positive for abdominal pain. Genitourinary: Negative for difficulty urinating. Musculoskeletal: Negative for gait problem. Skin: Negative for rash. Neurological: Negative for headaches. Hematological: Does not bruise/bleed easily. Psychiatric/Behavioral: Negative for sleep disturbance.        Vitals:    01/05/21 1746   BP: 135/85   Pulse: 92   Resp: 19   Temp: 98 °F (36.7 °C)   SpO2: 98%   Weight: 100 kg (220 lb 7.4 oz)   Height: 5' 7\" (1.702 m)            Physical Exam  Constitutional:       Appearance: Normal appearance. HENT:      Head: Normocephalic. Nose: Nose normal.      Mouth/Throat:      Mouth: Mucous membranes are moist.   Eyes:      Extraocular Movements: Extraocular movements intact. Conjunctiva/sclera: Conjunctivae normal.   Cardiovascular:      Rate and Rhythm: Normal rate. Pulmonary:      Effort: Pulmonary effort is normal. No respiratory distress. Abdominal:      General: Abdomen is flat. Tenderness: There is abdominal tenderness in the right lower quadrant. Musculoskeletal: Normal range of motion. Skin:     Findings: No rash. Neurological:      General: No focal deficit present. Mental Status: She is alert. Psychiatric:         Behavior: Behavior normal.          MDM  Number of Diagnoses or Management Options  Diagnosis management comments: Fairly well-appearing 25-year-old who has some right lower quadrant tenderness that has been going on a little too long for me to have a high suspicion for appendicitis, but nonetheless, has significant pain that is not going away. The fact that is ongoing after her menses and she does not have pelvic pain makes me think GYN is a little less likely, but still possible. I have ordered labs, urine, and a CT scan. Patient says her wife brought her here, so have given her some tramadol because she did not want anything stronger. She specifically requested tramadol. I am also going to test her urine pregnancy. Because her work-up was incomplete at the time of shift change, I will sign her over to my colleague, Dr. Debbie Aguirre. Anticipate discharge if the work-up here is negative.          Procedures

## 2021-01-05 NOTE — ED TRIAGE NOTES
TRIAGE NOTE: Patient arrived from home with c/o RIGHT lower abd pain for the pat 3 weeks. Denies nausea/vomiting/diarrhea. Denies fever or chills.

## 2021-01-06 NOTE — ED NOTES
Addendum:    8:02 PM  Change of shift. Care of patient taken over from Dr. Devang Ontiveros; H&P reviewed, bedside handoff complete. Awaiting disposition    Imaging and labs reviewed. Will provide bentyl as she notes discomfort shortly after eating in the lower abdomen. Will have patient follow with GI as she is requesting specialist follow up. Patient agrees to plan, stable at [place]. VITAL SIGNS:  Patient Vitals for the past 4 hrs:   Temp Pulse Resp BP SpO2   01/05/21 1930    (!) 143/82 98 %   01/05/21 1915     100 %   01/05/21 1815    136/81 100 %   01/05/21 1800     100 %   01/05/21 1746 98 °F (36.7 °C) 92 19 135/85 98 %   01/05/21 1745    121/77 98 %         LABS:  Recent Results (from the past 6 hour(s))   CBC WITH AUTOMATED DIFF    Collection Time: 01/05/21  5:54 PM   Result Value Ref Range    WBC 7.5 3.6 - 11.0 K/uL    RBC 4.06 3.80 - 5.20 M/uL    HGB 10.5 (L) 11.5 - 16.0 g/dL    HCT 34.2 (L) 35.0 - 47.0 %    MCV 84.2 80.0 - 99.0 FL    MCH 25.9 (L) 26.0 - 34.0 PG    MCHC 30.7 30.0 - 36.5 g/dL    RDW 16.7 (H) 11.5 - 14.5 %    PLATELET 663 661 - 229 K/uL    MPV 9.4 8.9 - 12.9 FL    NRBC 0.0 0  WBC    ABSOLUTE NRBC 0.00 0.00 - 0.01 K/uL    NEUTROPHILS 59 32 - 75 %    LYMPHOCYTES 25 12 - 49 %    MONOCYTES 8 5 - 13 %    EOSINOPHILS 7 0 - 7 %    BASOPHILS 1 0 - 1 %    IMMATURE GRANULOCYTES 0 0.0 - 0.5 %    ABS. NEUTROPHILS 4.4 1.8 - 8.0 K/UL    ABS. LYMPHOCYTES 1.9 0.8 - 3.5 K/UL    ABS. MONOCYTES 0.6 0.0 - 1.0 K/UL    ABS. EOSINOPHILS 0.5 (H) 0.0 - 0.4 K/UL    ABS. BASOPHILS 0.1 0.0 - 0.1 K/UL    ABS. IMM.  GRANS. 0.0 0.00 - 0.04 K/UL    DF AUTOMATED     METABOLIC PANEL, COMPREHENSIVE    Collection Time: 01/05/21  5:54 PM   Result Value Ref Range    Sodium 142 136 - 145 mmol/L    Potassium 3.7 3.5 - 5.1 mmol/L    Chloride 106 97 - 108 mmol/L    CO2 30 21 - 32 mmol/L    Anion gap 6 5 - 15 mmol/L    Glucose 85 65 - 100 mg/dL    BUN 12 6 - 20 MG/DL    Creatinine 0.83 0.55 - 1.02 MG/DL    BUN/Creatinine ratio 14 12 - 20      GFR est AA >60 >60 ml/min/1.73m2    GFR est non-AA >60 >60 ml/min/1.73m2    Calcium 8.7 8.5 - 10.1 MG/DL    Bilirubin, total 0.2 0.2 - 1.0 MG/DL    ALT (SGPT) 13 12 - 78 U/L    AST (SGOT) 10 (L) 15 - 37 U/L    Alk. phosphatase 73 45 - 117 U/L    Protein, total 6.9 6.4 - 8.2 g/dL    Albumin 3.4 (L) 3.5 - 5.0 g/dL    Globulin 3.5 2.0 - 4.0 g/dL    A-G Ratio 1.0 (L) 1.1 - 2.2     URINALYSIS W/MICROSCOPIC    Collection Time: 01/05/21  6:24 PM   Result Value Ref Range    Color YELLOW/STRAW      Appearance CLEAR CLEAR      Specific gravity 1.025 1.003 - 1.030      pH (UA) 6.0 5.0 - 8.0      Protein Negative NEG mg/dL    Glucose Negative NEG mg/dL    Ketone TRACE (A) NEG mg/dL    Bilirubin Negative NEG      Blood Negative NEG      Urobilinogen 0.2 0.2 - 1.0 EU/dL    Nitrites Negative NEG      Leukocyte Esterase Negative NEG      WBC 0-4 0 - 4 /hpf    RBC 0-5 0 - 5 /hpf    Epithelial cells FEW FEW /lpf    Bacteria Negative NEG /hpf    Mucus 1+ (A) NEG /lpf   URINE CULTURE HOLD SAMPLE    Collection Time: 01/05/21  6:24 PM    Specimen: Serum; Urine   Result Value Ref Range    Urine culture hold        Urine on hold in Microbiology dept for 2 days. If unpreserved urine is submitted, it cannot be used for addtional testing after 24 hours, recollection will be required. HCG URINE, QL. - POC    Collection Time: 01/05/21  7:14 PM   Result Value Ref Range    Pregnancy test,urine (POC) Negative NEG          IMAGING:  CT ABD PELV W CONT   Final Result   IMPRESSION:   No acute abnormality. Medications During Visit:  Medications   traMADoL (ULTRAM) tablet 50 mg (50 mg Oral Given 1/5/21 1914)   0.9% sodium chloride infusion 50 mL (50 mL IntraVENous New Bag 1/5/21 1954)   iopamidoL (ISOVUE-370) 76 % injection 100 mL (100 mL IntraVENous Given 1/5/21 1954)   0.9% sodium chloride injection 10 mL (10 mL IntraVENous Given 1/5/21 1955)     IMPRESSION:  1. Abdominal pain, right lower quadrant    2. Hypochromic anemia        DISPOSITION:  Discharged      Current Discharge Medication List      START taking these medications    Details   dicyclomine (BENTYL) 10 mg capsule Take 1 Cap by mouth three (3) times daily for 7 days. Qty: 21 Cap, Refills: 0         CONTINUE these medications which have NOT CHANGED    Details   dicyclomine (BENTYL) 20 mg tablet Take 1 Tab by mouth every six (6) hours. Qty: 20 Tab, Refills: 0              Follow-up Information     Follow up With Specialties Details Why Contact Info    Lara Malloy MD Internal Medicine Schedule an appointment as soon as possible for a visit   [address] [place] [address] 21       [address] [facility] Emergency Medicine  As needed, If symptoms worsen [address]    Chiquis Andrew MD Gastroenterology Schedule an appointment as soon as possible for a visit   [address] [facility]  [address]  [address]  [phone]              The patient is asked to follow-up with their primary care provider in the next several days. They are to call tomorrow for an appointment. The patient is asked to return promptly for any increased concerns or worsening of symptoms. They can return to this emergency department or any other emergency department.

## 2021-01-13 DIAGNOSIS — F41.9 ANXIETY: ICD-10-CM

## 2021-01-13 DIAGNOSIS — J45.40 MODERATE PERSISTENT ASTHMA WITHOUT COMPLICATION: ICD-10-CM

## 2021-01-13 DIAGNOSIS — F51.01 PRIMARY INSOMNIA: ICD-10-CM

## 2021-01-13 DIAGNOSIS — J30.9 ALLERGIC RHINITIS, UNSPECIFIED SEASONALITY, UNSPECIFIED TRIGGER: ICD-10-CM

## 2021-01-13 RX ORDER — MONTELUKAST SODIUM 10 MG/1
TABLET ORAL
Qty: 90 TAB | Refills: 0 | Status: SHIPPED | OUTPATIENT
Start: 2021-01-13 | End: 2021-04-24 | Stop reason: SDUPTHER

## 2021-01-13 RX ORDER — CITALOPRAM 20 MG/1
TABLET, FILM COATED ORAL
Qty: 90 TAB | Refills: 0 | Status: SHIPPED | OUTPATIENT
Start: 2021-01-13 | End: 2021-04-24 | Stop reason: SDUPTHER

## 2021-01-13 RX ORDER — TRAZODONE HYDROCHLORIDE 50 MG/1
TABLET ORAL
Qty: 90 TAB | Refills: 0 | Status: SHIPPED | OUTPATIENT
Start: 2021-01-13 | End: 2021-04-24 | Stop reason: SDUPTHER

## 2021-03-08 DIAGNOSIS — J45.40 MODERATE PERSISTENT ASTHMA WITHOUT COMPLICATION: Primary | ICD-10-CM

## 2021-03-08 RX ORDER — FLUTICASONE PROPIONATE AND SALMETEROL 250; 50 UG/1; UG/1
1 POWDER RESPIRATORY (INHALATION) EVERY 12 HOURS
Qty: 180 EACH | Refills: 1 | Status: SHIPPED | OUTPATIENT
Start: 2021-03-08 | End: 2021-06-06

## 2021-03-16 ENCOUNTER — TRANSCRIBE ORDER (OUTPATIENT)
Dept: SCHEDULING | Age: 31
End: 2021-03-16

## 2021-03-16 DIAGNOSIS — R10.9 ABDOMINAL PAIN: ICD-10-CM

## 2021-03-16 DIAGNOSIS — Z01.419 ROUTINE GYNECOLOGICAL EXAMINATION: ICD-10-CM

## 2021-03-16 DIAGNOSIS — D25.9 LEIOMYOMA OF UTERUS, UNSPECIFIED: Primary | ICD-10-CM

## 2021-03-23 ENCOUNTER — TRANSCRIBE ORDER (OUTPATIENT)
Dept: SCHEDULING | Age: 31
End: 2021-03-23

## 2021-03-23 DIAGNOSIS — Z01.419 ROUTINE GYNECOLOGICAL EXAMINATION: ICD-10-CM

## 2021-03-23 DIAGNOSIS — R10.9 ABDOMINAL PAIN: ICD-10-CM

## 2021-03-23 DIAGNOSIS — D25.9 LEIOMYOMA OF UTERUS, UNSPECIFIED: Primary | ICD-10-CM

## 2021-04-05 ENCOUNTER — HOSPITAL ENCOUNTER (OUTPATIENT)
Dept: ULTRASOUND IMAGING | Age: 31
Discharge: HOME | End: 2021-04-05
Attending: OBSTETRICS & GYNECOLOGY
Payer: COMMERCIAL

## 2021-04-05 DIAGNOSIS — Z01.419 ROUTINE GYNECOLOGICAL EXAMINATION: ICD-10-CM

## 2021-04-05 DIAGNOSIS — R10.9 ABDOMINAL PAIN: ICD-10-CM

## 2021-04-05 DIAGNOSIS — D25.9 LEIOMYOMA OF UTERUS, UNSPECIFIED: ICD-10-CM

## 2021-04-05 PROCEDURE — 76830 TRANSVAGINAL US NON-OB: CPT

## 2021-04-05 PROCEDURE — 76856 US EXAM PELVIC COMPLETE: CPT

## 2021-04-09 ENCOUNTER — TRANSCRIBE ORDER (OUTPATIENT)
Dept: SCHEDULING | Age: 31
End: 2021-04-09

## 2021-04-09 DIAGNOSIS — R10.2 FEMALE PELVIC PAIN: ICD-10-CM

## 2021-04-09 DIAGNOSIS — N92.0 EXCESSIVE OR FREQUENT MENSTRUATION: Primary | ICD-10-CM

## 2021-04-24 DIAGNOSIS — F51.01 PRIMARY INSOMNIA: ICD-10-CM

## 2021-04-24 DIAGNOSIS — J30.9 ALLERGIC RHINITIS, UNSPECIFIED SEASONALITY, UNSPECIFIED TRIGGER: ICD-10-CM

## 2021-04-24 DIAGNOSIS — J45.40 MODERATE PERSISTENT ASTHMA WITHOUT COMPLICATION: ICD-10-CM

## 2021-04-24 DIAGNOSIS — F41.9 ANXIETY: ICD-10-CM

## 2021-04-24 RX ORDER — MONTELUKAST SODIUM 10 MG/1
10 TABLET ORAL DAILY
Qty: 90 TAB | Refills: 0 | Status: SHIPPED | OUTPATIENT
Start: 2021-04-24 | End: 2021-04-26 | Stop reason: SDUPTHER

## 2021-04-24 RX ORDER — TRAZODONE HYDROCHLORIDE 50 MG/1
50 TABLET ORAL
Qty: 90 TAB | Refills: 0 | Status: SHIPPED | OUTPATIENT
Start: 2021-04-24 | End: 2021-07-23

## 2021-04-24 RX ORDER — CITALOPRAM 20 MG/1
20 TABLET, FILM COATED ORAL DAILY
Qty: 90 TAB | Refills: 0 | Status: SHIPPED | OUTPATIENT
Start: 2021-04-24 | End: 2021-04-26 | Stop reason: SDUPTHER

## 2021-04-26 DIAGNOSIS — J30.9 ALLERGIC RHINITIS, UNSPECIFIED SEASONALITY, UNSPECIFIED TRIGGER: ICD-10-CM

## 2021-04-26 DIAGNOSIS — F41.9 ANXIETY: ICD-10-CM

## 2021-04-26 DIAGNOSIS — J45.40 MODERATE PERSISTENT ASTHMA WITHOUT COMPLICATION: ICD-10-CM

## 2021-04-26 RX ORDER — CITALOPRAM 20 MG/1
20 TABLET, FILM COATED ORAL DAILY
Qty: 90 TAB | Refills: 0 | Status: SHIPPED | OUTPATIENT
Start: 2021-04-26 | End: 2021-09-24

## 2021-04-26 RX ORDER — MONTELUKAST SODIUM 10 MG/1
10 TABLET ORAL DAILY
Qty: 90 TAB | Refills: 0 | Status: SHIPPED | OUTPATIENT
Start: 2021-04-26 | End: 2021-07-25

## 2021-04-26 NOTE — TELEPHONE ENCOUNTER
Requested Prescriptions     Pending Prescriptions Disp Refills    citalopram (CELEXA) 20 mg tablet 90 Tab 0     Sig: Take 1 Tab by mouth daily for 90 days.  montelukast (SINGULAIR) 10 mg tablet 90 Tab 0     Sig: Take 1 Tab by mouth daily for 90 days.         Last Visit 9/21/20  Last Refill 4/24/21 for both

## 2021-05-10 ENCOUNTER — OFFICE VISIT (OUTPATIENT)
Dept: PRIMARY CARE CLINIC | Age: 31
End: 2021-05-10
Payer: COMMERCIAL

## 2021-05-10 VITALS
WEIGHT: 220.2 LBS | HEART RATE: 100 BPM | RESPIRATION RATE: 18 BRPM | OXYGEN SATURATION: 100 % | BODY MASS INDEX: 34.56 KG/M2 | HEIGHT: 67 IN | TEMPERATURE: 97.1 F | SYSTOLIC BLOOD PRESSURE: 123 MMHG | DIASTOLIC BLOOD PRESSURE: 76 MMHG

## 2021-05-10 DIAGNOSIS — Z23 NEED FOR DIPHTHERIA-TETANUS-PERTUSSIS (TDAP) VACCINE: ICD-10-CM

## 2021-05-10 DIAGNOSIS — S61.314D LACERATION OF RIGHT RING FINGER WITHOUT FOREIGN BODY WITH DAMAGE TO NAIL, SUBSEQUENT ENCOUNTER: ICD-10-CM

## 2021-05-10 DIAGNOSIS — L03.011 CELLULITIS OF FINGER OF RIGHT HAND: Primary | ICD-10-CM

## 2021-05-10 PROCEDURE — 99213 OFFICE O/P EST LOW 20 MIN: CPT | Performed by: INTERNAL MEDICINE

## 2021-05-10 RX ORDER — DOXYCYCLINE 100 MG/1
100 CAPSULE ORAL 2 TIMES DAILY
Qty: 20 CAP | Refills: 0 | Status: SHIPPED | OUTPATIENT
Start: 2021-05-10 | End: 2021-05-20

## 2021-05-10 NOTE — PROGRESS NOTES
Chief Complaint   Patient presents with    ED Follow-up     [facility]

## 2021-05-10 NOTE — PROGRESS NOTES
Caryle Harman (: 1990) is a 33 y.o. female, established patient, here for evaluation of the following chief complaint(s):  ED Follow-up (was seen at [facility] 21)     Written by Sally Del Castillo, as dictated by Dr. Memo Bennett MD.      ASSESSMENT/PLAN:  Below is the assessment and plan developed based on review of pertinent history, physical exam, labs, studies, and medications. 1. Cellulitis of finger of right hand  Explained that it may take up to 2 weeks for the laceration to heal. Will prescribe doxycycline 100mg BID x10 days. Potential side effects were discussed. Note written that she should not type for 2 weeks and to return to work on 21.  -     doxycycline (VIBRAMYCIN) 100 mg capsule; Take 1 Cap by mouth two (2) times a day for 10 days. , Normal, Disp-20 Cap, R-0  2. Laceration of right ring finger without foreign body with damage to nail, subsequent encounter  See above. 3. Need for diphtheria-tetanus-pertussis (Tdap) vaccine  Tdap was not  administered in office today due to anaphylaxis from penicillin and streptomycin.  -         SUBJECTIVE/OBJECTIVE:  HPI  Patient presents today for an ER follow-up. She states that last night she was using a mandolin to cut vegetables and lacerated her right ring finger. She was seen at [facility] ER on 21 and had liquid stitches placed. She was not placed on antibiotics or given a tetanus shot. She states that she continues to have pain. Patient Active Problem List   Diagnosis Code    Moderate persistent asthma without complication D05.12    Anxiety F41.9        Current Outpatient Medications on File Prior to Visit   Medication Sig Dispense Refill    citalopram (CELEXA) 20 mg tablet Take 1 Tab by mouth daily for 90 days. 90 Tab 0    montelukast (SINGULAIR) 10 mg tablet Take 1 Tab by mouth daily for 90 days. 90 Tab 0    traZODone (DESYREL) 50 mg tablet Take 1 Tab by mouth nightly for 90 days.  90 Tab 0    fluticasone propion-salmeteroL (ADVAIR/WIXELA) 250-50 mcg/dose diskus inhaler Take 1 Puff by inhalation every twelve (12) hours for 90 days. 180 Each 1    dicyclomine (BENTYL) 20 mg tablet Take 1 Tab by mouth every six (6) hours. 20 Tab 0    omeprazole (PRILOSEC) 40 mg capsule Take 1 Cap by mouth daily. 30 Cap 0    ondansetron (Zofran ODT) 4 mg disintegrating tablet Take 1 Tab by mouth every eight (8) hours as needed for Nausea. 10 Tab 0    fluticasone propion-salmeteroL (Advair Diskus) 250-50 mcg/dose diskus inhaler TAKE 1 PUFF BY MOUTH EVERY 12 HOURS 1 Each 1    cyclobenzaprine (FLEXERIL) 10 mg tablet Take 10 mg by mouth three (3) times daily as needed.  albuterol (PROVENTIL HFA, VENTOLIN HFA, PROAIR HFA) 90 mcg/actuation inhaler INHALE 1 PUFF BY MOUTH EVERY 6 HOURS AS NEEDED FOR WHEEZING 18 Inhaler 0    enoxaparin (LOVENOX) 40 mg/0.4 mL 40 mg by SubCUTAneous route two (2) times a day.  promethazine (PHENERGAN) 25 mg tablet Take 1 Tab by mouth every eight (8) hours as needed for Nausea for up to 10 doses. 10 Tab 0    ascorbic acid, vitamin C, (VITAMIN C) 1,000 mg tablet Take 1,000 mg by mouth.  ergocalciferol (VITAMIN D2) 50,000 unit capsule Take 50,000 Units by mouth.  Biotin 2,500 mcg cap Take  by mouth.  vitamin e (E GEMS) 100 unit capsule Take  by mouth daily.  zinc oxide-cod liver oil (DESITIN) 40 % ointment Apply  to affected area as needed for Skin Irritation.  folic acid-vit L8-TJK T56 2.2-25-1 mg tab Take  by mouth.  traZODone (DESYREL) 50 mg tablet Take 50 mg by mouth. No current facility-administered medications on file prior to visit.         Allergies   Allergen Reactions    Aspirin Nausea and Vomiting    Nut - Unspecified Anaphylaxis    Peanut Anaphylaxis     And tree nuts    Penicillins Rash    Percocet [Oxycodone-Acetaminophen] Itching    Aspirin Other (comments)     Muscle spasms    Pcn [Penicillins] Unknown (comments)    Percocet [Oxycodone-Acetaminophen] Rash    Sulfa (Sulfonamide Antibiotics) Unknown (comments)     States it was a childhood reaction does not remember       Past Medical History:   Diagnosis Date    Arthritis     Asthma     Psychiatric disorder     depression and anxiety       Past Surgical History:   Procedure Laterality Date    HX ORTHOPAEDIC      bilateral ankle surgery       Family History   Problem Relation Age of Onset    Cancer Mother     Hypertension Father     Diabetes Father     Diabetes Brother     Asthma Maternal Grandmother     No Known Problems Paternal Grandmother     Hypertension Brother        Social History     Socioeconomic History    Marital status:      Spouse name: Not on file    Number of children: Not on file    Years of education: Not on file    Highest education level: Not on file   Occupational History    Not on file   Social Needs    Financial resource strain: Not on file    Food insecurity     Worry: Not on file     Inability: Not on file    Transportation needs     Medical: Not on file     Non-medical: Not on file   Tobacco Use    Smoking status: Never Smoker    Smokeless tobacco: Never Used   Substance and Sexual Activity    Alcohol use: Yes     Comment: occ/socially-    Drug use: Yes     Types: Marijuana     Comment: Recreational/Insomnia     Sexual activity: Yes     Partners: Female     Birth control/protection: None   Lifestyle    Physical activity     Days per week: Not on file     Minutes per session: Not on file    Stress: Not on file   Relationships    Social connections     Talks on phone: Not on file     Gets together: Not on file     Attends Sikhism service: Not on file     Active member of club or organization: Not on file     Attends meetings of clubs or organizations: Not on file     Relationship status: Not on file    Intimate partner violence     Fear of current or ex partner: Not on file     Emotionally abused: Not on file     Physically abused: Not on file     Forced sexual activity: Not on file   Other Topics Concern    Not on file   Social History Narrative    ** Merged History Encounter **            No visits with results within 3 Month(s) from this visit. Latest known visit with results is:   Admission on 01/05/2021, Discharged on 01/05/2021   Component Date Value Ref Range Status    WBC 01/05/2021 7.5  3.6 - 11.0 K/uL Final    RBC 01/05/2021 4.06  3.80 - 5.20 M/uL Final    HGB 01/05/2021 10.5* 11.5 - 16.0 g/dL Final    HCT 01/05/2021 34.2* 35.0 - 47.0 % Final    MCV 01/05/2021 84.2  80.0 - 99.0 FL Final    MCH 01/05/2021 25.9* 26.0 - 34.0 PG Final    MCHC 01/05/2021 30.7  30.0 - 36.5 g/dL Final    RDW 01/05/2021 16.7* 11.5 - 14.5 % Final    PLATELET 76/00/8273 829  150 - 400 K/uL Final    MPV 01/05/2021 9.4  8.9 - 12.9 FL Final    NRBC 01/05/2021 0.0  0  WBC Final    ABSOLUTE NRBC 01/05/2021 0.00  0.00 - 0.01 K/uL Final    NEUTROPHILS 01/05/2021 59  32 - 75 % Final    LYMPHOCYTES 01/05/2021 25  12 - 49 % Final    MONOCYTES 01/05/2021 8  5 - 13 % Final    EOSINOPHILS 01/05/2021 7  0 - 7 % Final    BASOPHILS 01/05/2021 1  0 - 1 % Final    IMMATURE GRANULOCYTES 01/05/2021 0  0.0 - 0.5 % Final    ABS. NEUTROPHILS 01/05/2021 4.4  1.8 - 8.0 K/UL Final    ABS. LYMPHOCYTES 01/05/2021 1.9  0.8 - 3.5 K/UL Final    ABS. MONOCYTES 01/05/2021 0.6  0.0 - 1.0 K/UL Final    ABS. EOSINOPHILS 01/05/2021 0.5* 0.0 - 0.4 K/UL Final    ABS. BASOPHILS 01/05/2021 0.1  0.0 - 0.1 K/UL Final    ABS. IMM.  GRANS. 01/05/2021 0.0  0.00 - 0.04 K/UL Final    DF 01/05/2021 AUTOMATED    Final    Sodium 01/05/2021 142  136 - 145 mmol/L Final    Potassium 01/05/2021 3.7  3.5 - 5.1 mmol/L Final    Chloride 01/05/2021 106  97 - 108 mmol/L Final    CO2 01/05/2021 30  21 - 32 mmol/L Final    Anion gap 01/05/2021 6  5 - 15 mmol/L Final    Glucose 01/05/2021 85  65 - 100 mg/dL Final    BUN 01/05/2021 12  6 - 20 MG/DL Final    Creatinine 01/05/2021 0.83  0.55 - 1.02 MG/DL Final  BUN/Creatinine ratio 01/05/2021 14  12 - 20   Final    GFR est AA 01/05/2021 >60  >60 ml/min/1.73m2 Final    GFR est non-AA 01/05/2021 >60  >60 ml/min/1.73m2 Final    Estimated GFR is calculated using the IDMS-traceable Modification of Diet in Renal Disease (MDRD) Study equation, reported for both  Americans (GFRAA) and non- Americans (GFRNA), and normalized to 1.73m2 body surface area. The physician must decide which value applies to the patient.  Calcium 01/05/2021 8.7  8.5 - 10.1 MG/DL Final    Bilirubin, total 01/05/2021 0.2  0.2 - 1.0 MG/DL Final    ALT (SGPT) 01/05/2021 13  12 - 78 U/L Final    AST (SGOT) 01/05/2021 10* 15 - 37 U/L Final    Alk. phosphatase 01/05/2021 73  45 - 117 U/L Final    Protein, total 01/05/2021 6.9  6.4 - 8.2 g/dL Final    Albumin 01/05/2021 3.4* 3.5 - 5.0 g/dL Final    Globulin 01/05/2021 3.5  2.0 - 4.0 g/dL Final    A-G Ratio 01/05/2021 1.0* 1.1 - 2.2   Final    Color 01/05/2021 YELLOW/STRAW    Final    Color Reference Range: Straw, Yellow or Dark Yellow    Appearance 01/05/2021 CLEAR  CLEAR   Final    Specific gravity 01/05/2021 1.025  1.003 - 1.030   Final    pH (UA) 01/05/2021 6.0  5.0 - 8.0   Final    Protein 01/05/2021 Negative  NEG mg/dL Final    Glucose 01/05/2021 Negative  NEG mg/dL Final    Ketone 01/05/2021 TRACE* NEG mg/dL Final    Bilirubin 01/05/2021 Negative  NEG   Final    Blood 01/05/2021 Negative  NEG   Final    Urobilinogen 01/05/2021 0.2  0.2 - 1.0 EU/dL Final    Nitrites 01/05/2021 Negative  NEG   Final    Leukocyte Esterase 01/05/2021 Negative  NEG   Final    WBC 01/05/2021 0-4  0 - 4 /hpf Final    RBC 01/05/2021 0-5  0 - 5 /hpf Final    Epithelial cells 01/05/2021 FEW  FEW /lpf Final    Epithelial cell category consists of squamous cells and /or transitional urothelial cells. Renal tubular cells, if present, are separately identified as such.     Bacteria 01/05/2021 Negative  NEG /hpf Final    Mucus 01/05/2021 1+* NEG /lpf Final    Urine culture hold 01/05/2021 Urine on hold in Microbiology dept for 2 days. If unpreserved urine is submitted, it cannot be used for addtional testing after 24 hours, recollection will be required. Final    Pregnancy test,urine (POC) 01/05/2021 Negative  NEG   Final      Review of Systems   Constitutional: Negative for activity change, fatigue and unexpected weight change. HENT: Negative for congestion, hearing loss, rhinorrhea and sore throat. Eyes: Negative for discharge. Respiratory: Negative for cough, chest tightness and shortness of breath. Cardiovascular: Negative for leg swelling. Gastrointestinal: Negative for abdominal pain, constipation and diarrhea. Genitourinary: Negative for dysuria, flank pain, frequency and urgency. Musculoskeletal: Negative for arthralgias, back pain and myalgias. Skin: Positive for wound (laceration R ring finger). Negative for color change and rash. Neurological: Negative for dizziness, light-headedness and headaches. Psychiatric/Behavioral: Negative for dysphoric mood and sleep disturbance. The patient is not nervous/anxious. Visit Vitals  /76 (BP 1 Location: Left upper arm, BP Patient Position: Sitting)   Pulse 100   Temp 97.1 °F (36.2 °C) (Temporal)   Resp 18   Ht 5' 7\" (1.702 m)   Wt 220 lb 3.2 oz (99.9 kg)   LMP 05/06/2021   SpO2 100%   BMI 34.49 kg/m²        Physical Exam  Vitals signs and nursing note reviewed. Constitutional:       General: She is not in acute distress. Appearance: Normal appearance. She is well-developed. She is not diaphoretic. HENT:      Right Ear: External ear normal.      Left Ear: External ear normal.   Eyes:      General: No scleral icterus. Right eye: No discharge. Left eye: No discharge. Extraocular Movements: Extraocular movements intact. Conjunctiva/sclera: Conjunctivae normal.   Neck:      Musculoskeletal: Normal range of motion and neck supple. Cardiovascular:      Rate and Rhythm: Normal rate and regular rhythm. Pulmonary:      Effort: Pulmonary effort is normal.      Breath sounds: Normal breath sounds. No wheezing. Abdominal:      General: Bowel sounds are normal.      Palpations: Abdomen is soft. Tenderness: There is no abdominal tenderness. Musculoskeletal:      Right hand: She exhibits laceration (R ring finger, tender with some erythema and warmth). Lymphadenopathy:      Cervical: No cervical adenopathy. Neurological:      Mental Status: She is alert and oriented to person, place, and time. Psychiatric:         Mood and Affect: Mood and affect normal.         An electronic signature was used to authenticate this note.   -- Heather Marie

## 2021-05-10 NOTE — LETTER
NOTIFICATION RETURN TO WORK / SCHOOL 
 
5/10/2021 11:04 AM 
 
Ms. Marcelino Fishman [address] [place] [id number] To Whom It May Concern: 
 
Marcelino Fishman is currently under the care of Samy Ojeda. Patient has a laceration on her R ring finger. She has liquid stitches placed and prescribed a course of antibiotics. Patient should not type for 2 weeks. She may return to work on 05/24/21. If there are questions or concerns please have the patient contact our office. Sincerely, Leonid Allred MD

## 2021-05-19 ENCOUNTER — VIRTUAL VISIT (OUTPATIENT)
Dept: PRIMARY CARE CLINIC | Age: 31
End: 2021-05-19
Payer: COMMERCIAL

## 2021-05-19 DIAGNOSIS — Z56.6 STRESS AT WORK: ICD-10-CM

## 2021-05-19 DIAGNOSIS — S69.91XD INJURY OF FINGER OF RIGHT HAND, SUBSEQUENT ENCOUNTER: Primary | ICD-10-CM

## 2021-05-19 DIAGNOSIS — F41.9 ANXIETY: ICD-10-CM

## 2021-05-19 DIAGNOSIS — J45.40 MODERATE PERSISTENT ASTHMA WITHOUT COMPLICATION: ICD-10-CM

## 2021-05-19 PROCEDURE — 99213 OFFICE O/P EST LOW 20 MIN: CPT | Performed by: INTERNAL MEDICINE

## 2021-05-19 SDOH — HEALTH STABILITY - MENTAL HEALTH: OTHER PHYSICAL AND MENTAL STRAIN RELATED TO WORK: Z56.6

## 2021-05-19 NOTE — PROGRESS NOTES
Travon Ferguson (: 1990) is a 33 y.o. female, established patient, here for evaluation of the following chief complaint(s):   No chief complaint on file. Written by Renard Forrester, as dictated by Dr. Wilson Baker MD.      ASSESSMENT/PLAN:  Below is the assessment and plan developed based on review of pertinent labs, studies, and medications. 1. Injury of finger of right hand, subsequent encounter  She continues to have pain in her R ring finger with typing which affects her job performance. Completed FMLA paperwork to be out of work until 21. 2. Moderate persistent asthma without complication  Well controlled on inhalers. Continue current oral  medications. 3. Stress at work  Due to stress at work, her psychologist has written her to be out of work until 21.     4. Anxiety  She has been working on coping mechanisms and grounding techniques with psychology. If her chest pain returns she may need referral to cardiology. SUBJECTIVE/OBJECTIVE:  HPI   The patient presents today for follow up &  to have FMLA paperwork completed. She was last seen in office on 05/10/21 for cellulitis of her R ring finger and prescribed doxycycline. She continues to have pain in her R ring finger especially with typing. She has been having to type with her middle and index finger. She is able to write without pain. Her psychologist has written her to be out of work until 21 due to work stress. Her asthma has been exacerbated recently due to the pollen. She has been taking her prescription inhalers which help improve sxs. She was having CP and insomnia which she believes is due to anxiety. She has been working on coping mechanisms and grounding techniques with her psychologist. The CP has improved over the past few days.      Patient Active Problem List   Diagnosis Code    Moderate persistent asthma without complication H38.97    Anxiety F41.9        Current Outpatient Medications on File Prior to Visit   Medication Sig Dispense Refill    doxycycline (VIBRAMYCIN) 100 mg capsule Take 1 Cap by mouth two (2) times a day for 10 days. 20 Cap 0    citalopram (CELEXA) 20 mg tablet Take 1 Tab by mouth daily for 90 days. 90 Tab 0    montelukast (SINGULAIR) 10 mg tablet Take 1 Tab by mouth daily for 90 days. 90 Tab 0    traZODone (DESYREL) 50 mg tablet Take 1 Tab by mouth nightly for 90 days. 90 Tab 0    fluticasone propion-salmeteroL (ADVAIR/WIXELA) 250-50 mcg/dose diskus inhaler Take 1 Puff by inhalation every twelve (12) hours for 90 days. 180 Each 1    dicyclomine (BENTYL) 20 mg tablet Take 1 Tab by mouth every six (6) hours. 20 Tab 0    omeprazole (PRILOSEC) 40 mg capsule Take 1 Cap by mouth daily. 30 Cap 0    ondansetron (Zofran ODT) 4 mg disintegrating tablet Take 1 Tab by mouth every eight (8) hours as needed for Nausea. 10 Tab 0    fluticasone propion-salmeteroL (Advair Diskus) 250-50 mcg/dose diskus inhaler TAKE 1 PUFF BY MOUTH EVERY 12 HOURS 1 Each 1    enoxaparin (LOVENOX) 40 mg/0.4 mL 40 mg by SubCUTAneous route two (2) times a day.  cyclobenzaprine (FLEXERIL) 10 mg tablet Take 10 mg by mouth three (3) times daily as needed.  albuterol (PROVENTIL HFA, VENTOLIN HFA, PROAIR HFA) 90 mcg/actuation inhaler INHALE 1 PUFF BY MOUTH EVERY 6 HOURS AS NEEDED FOR WHEEZING 18 Inhaler 0    promethazine (PHENERGAN) 25 mg tablet Take 1 Tab by mouth every eight (8) hours as needed for Nausea for up to 10 doses. 10 Tab 0    ascorbic acid, vitamin C, (VITAMIN C) 1,000 mg tablet Take 1,000 mg by mouth.  ergocalciferol (VITAMIN D2) 50,000 unit capsule Take 50,000 Units by mouth.  Biotin 2,500 mcg cap Take  by mouth.  vitamin e (E GEMS) 100 unit capsule Take  by mouth daily.  zinc oxide-cod liver oil (DESITIN) 40 % ointment Apply  to affected area as needed for Skin Irritation.  folic acid-vit M3-SPK A03 2.2-25-1 mg tab Take  by mouth.       traZODone (DESYREL) 50 mg tablet Take 50 mg by mouth. No current facility-administered medications on file prior to visit.        Allergies   Allergen Reactions    Aspirin Nausea and Vomiting    Nut - Unspecified Anaphylaxis    Peanut Anaphylaxis     And tree nuts    Penicillins Rash    Percocet [Oxycodone-Acetaminophen] Itching    Aspirin Other (comments)     Muscle spasms    Pcn [Penicillins] Unknown (comments)    Percocet [Oxycodone-Acetaminophen] Rash    Sulfa (Sulfonamide Antibiotics) Unknown (comments)     States it was a childhood reaction does not remember       Past Medical History:   Diagnosis Date    Arthritis     Asthma     Psychiatric disorder     depression and anxiety       Past Surgical History:   Procedure Laterality Date    HX ORTHOPAEDIC      bilateral ankle surgery       Family History   Problem Relation Age of Onset    Cancer Mother     Hypertension Father     Diabetes Father     Diabetes Brother     Asthma Maternal Grandmother     No Known Problems Paternal Grandmother     Hypertension Brother        Social History     Socioeconomic History    Marital status:      Spouse name: Not on file    Number of children: Not on file    Years of education: Not on file    Highest education level: Not on file   Occupational History    Not on file   Tobacco Use    Smoking status: Never Smoker    Smokeless tobacco: Never Used   Substance and Sexual Activity    Alcohol use: Yes     Comment: occ/socially-    Drug use: Yes     Types: Marijuana     Comment: Recreational/Insomnia     Sexual activity: Yes     Partners: Female     Birth control/protection: None   Other Topics Concern    Not on file   Social History Narrative    ** Merged History Encounter **          Social Determinants of Health     Financial Resource Strain:     Difficulty of Paying Living Expenses:    Food Insecurity:     Worried About Running Out of Food in the Last Year:     [address] in the Last Year: Transportation Needs:     Lack of Transportation (Medical):  Lack of Transportation (Non-Medical):    Physical Activity:     Days of Exercise per Week:     Minutes of Exercise per Session:    Stress:     Feeling of Stress :    Social Connections:     Frequency of Communication with Friends and Family:     Frequency of Social Gatherings with Friends and Family:     Attends Yazidi Services:     Active Member of Clubs or Organizations:     Attends Club or Organization Meetings:     Marital Status:    Intimate Partner Violence:     Fear of Current or Ex-Partner:     Emotionally Abused:     Physically Abused:     Sexually Abused:        No visits with results within 3 Month(s) from this visit. Latest known visit with results is:   Admission on 01/05/2021, Discharged on 01/05/2021   Component Date Value Ref Range Status    WBC 01/05/2021 7.5  3.6 - 11.0 K/uL Final    RBC 01/05/2021 4.06  3.80 - 5.20 M/uL Final    HGB 01/05/2021 10.5* 11.5 - 16.0 g/dL Final    HCT 01/05/2021 34.2* 35.0 - 47.0 % Final    MCV 01/05/2021 84.2  80.0 - 99.0 FL Final    MCH 01/05/2021 25.9* 26.0 - 34.0 PG Final    MCHC 01/05/2021 30.7  30.0 - 36.5 g/dL Final    RDW 01/05/2021 16.7* 11.5 - 14.5 % Final    PLATELET 70/37/0974 078  150 - 400 K/uL Final    MPV 01/05/2021 9.4  8.9 - 12.9 FL Final    NRBC 01/05/2021 0.0  0  WBC Final    ABSOLUTE NRBC 01/05/2021 0.00  0.00 - 0.01 K/uL Final    NEUTROPHILS 01/05/2021 59  32 - 75 % Final    LYMPHOCYTES 01/05/2021 25  12 - 49 % Final    MONOCYTES 01/05/2021 8  5 - 13 % Final    EOSINOPHILS 01/05/2021 7  0 - 7 % Final    BASOPHILS 01/05/2021 1  0 - 1 % Final    IMMATURE GRANULOCYTES 01/05/2021 0  0.0 - 0.5 % Final    ABS. NEUTROPHILS 01/05/2021 4.4  1.8 - 8.0 K/UL Final    ABS. LYMPHOCYTES 01/05/2021 1.9  0.8 - 3.5 K/UL Final    ABS. MONOCYTES 01/05/2021 0.6  0.0 - 1.0 K/UL Final    ABS. EOSINOPHILS 01/05/2021 0.5* 0.0 - 0.4 K/UL Final    ABS. BASOPHILS 01/05/2021 0.1  0.0 - 0.1 K/UL Final    ABS. IMM. GRANS. 01/05/2021 0.0  0.00 - 0.04 K/UL Final    DF 01/05/2021 AUTOMATED    Final    Sodium 01/05/2021 142  136 - 145 mmol/L Final    Potassium 01/05/2021 3.7  3.5 - 5.1 mmol/L Final    Chloride 01/05/2021 106  97 - 108 mmol/L Final    CO2 01/05/2021 30  21 - 32 mmol/L Final    Anion gap 01/05/2021 6  5 - 15 mmol/L Final    Glucose 01/05/2021 85  65 - 100 mg/dL Final    BUN 01/05/2021 12  6 - 20 MG/DL Final    Creatinine 01/05/2021 0.83  0.55 - 1.02 MG/DL Final    BUN/Creatinine ratio 01/05/2021 14  12 - 20   Final    GFR est AA 01/05/2021 >60  >60 ml/min/1.73m2 Final    GFR est non-AA 01/05/2021 >60  >60 ml/min/1.73m2 Final    Estimated GFR is calculated using the IDMS-traceable Modification of Diet in Renal Disease (MDRD) Study equation, reported for both  Americans (GFRAA) and non- Americans (GFRNA), and normalized to 1.73m2 body surface area. The physician must decide which value applies to the patient.  Calcium 01/05/2021 8.7  8.5 - 10.1 MG/DL Final    Bilirubin, total 01/05/2021 0.2  0.2 - 1.0 MG/DL Final    ALT (SGPT) 01/05/2021 13  12 - 78 U/L Final    AST (SGOT) 01/05/2021 10* 15 - 37 U/L Final    Alk.  phosphatase 01/05/2021 73  45 - 117 U/L Final    Protein, total 01/05/2021 6.9  6.4 - 8.2 g/dL Final    Albumin 01/05/2021 3.4* 3.5 - 5.0 g/dL Final    Globulin 01/05/2021 3.5  2.0 - 4.0 g/dL Final    A-G Ratio 01/05/2021 1.0* 1.1 - 2.2   Final    Color 01/05/2021 YELLOW/STRAW    Final    Color Reference Range: Straw, Yellow or Dark Yellow    Appearance 01/05/2021 CLEAR  CLEAR   Final    Specific gravity 01/05/2021 1.025  1.003 - 1.030   Final    pH (UA) 01/05/2021 6.0  5.0 - 8.0   Final    Protein 01/05/2021 Negative  NEG mg/dL Final    Glucose 01/05/2021 Negative  NEG mg/dL Final    Ketone 01/05/2021 TRACE* NEG mg/dL Final    Bilirubin 01/05/2021 Negative  NEG   Final    Blood 01/05/2021 Negative  NEG Final    Urobilinogen 01/05/2021 0.2  0.2 - 1.0 EU/dL Final    Nitrites 01/05/2021 Negative  NEG   Final    Leukocyte Esterase 01/05/2021 Negative  NEG   Final    WBC 01/05/2021 0-4  0 - 4 /hpf Final    RBC 01/05/2021 0-5  0 - 5 /hpf Final    Epithelial cells 01/05/2021 FEW  FEW /lpf Final    Epithelial cell category consists of squamous cells and /or transitional urothelial cells. Renal tubular cells, if present, are separately identified as such.  Bacteria 01/05/2021 Negative  NEG /hpf Final    Mucus 01/05/2021 1+* NEG /lpf Final    Urine culture hold 01/05/2021 Urine on hold in Microbiology dept for 2 days. If unpreserved urine is submitted, it cannot be used for addtional testing after 24 hours, recollection will be required. Final    Pregnancy test,urine (POC) 01/05/2021 Negative  NEG   Final      Review of Systems   Constitutional: Negative for activity change, fatigue and unexpected weight change. HENT: Negative for congestion, hearing loss, rhinorrhea and sore throat. Eyes: Negative for discharge. Respiratory: Negative for cough, chest tightness and shortness of breath. Cardiovascular: Negative for leg swelling. Gastrointestinal: Negative for abdominal pain, constipation and diarrhea. Genitourinary: Negative for dysuria, flank pain, frequency and urgency. Musculoskeletal: Negative for arthralgias, back pain and myalgias. Skin: Negative for color change and rash. Neurological: Negative for dizziness, light-headedness and headaches. Psychiatric/Behavioral: Negative for dysphoric mood and sleep disturbance. The patient is not nervous/anxious.          Patient-Reported Vitals 12/28/2020   Patient-Reported Height -   Patient-Reported Pulse 94   Patient-Reported Temperature 98.1   Patient-Reported Systolic  780   Patient-Reported Diastolic 79   Patient-Reported LMP -       Physical Exam    [INSTRUCTIONS:  \"[x]\" Indicates a positive item  \"[]\" Indicates a negative item  -- DELETE ALL ITEMS NOT EXAMINED]    Constitutional: [x] Appears well-developed and well-nourished [x] No apparent distress      [] Abnormal -     Mental status: [x] Alert and awake  [x] Oriented to person/place/time [x] Able to follow commands    [] Abnormal -     Eyes:   EOM    [x]  Normal    [] Abnormal -   Sclera  [x]  Normal    [] Abnormal -          Discharge [x]  None visible   [] Abnormal -     HENT: [x] Normocephalic, atraumatic  [] Abnormal -   [x] Mouth/Throat: Mucous membranes are moist    External Ears [x] Normal  [] Abnormal -    Neck: [x] No visualized mass [] Abnormal -     Pulmonary/Chest: [x] Respiratory effort normal   [x] No visualized signs of difficulty breathing or respiratory distress        [] Abnormal -      Musculoskeletal:   [x] Normal gait with no signs of ataxia         [x] Normal range of motion of neck        [] Abnormal -     Neurological:        [x] No Facial Asymmetry (Cranial nerve 7 motor function) (limited exam due to video visit)          [x] No gaze palsy        [] Abnormal -          Skin:        [x] No significant exanthematous lesions or discoloration noted on facial skin         [] Abnormal -            Psychiatric:       [x] Normal Affect [] Abnormal -        [x] No Hallucinations    Other pertinent observable physical exam findings:-     Yahir Sesay, was evaluated through a synchronous (real-time) audio-video encounter. The patient (or guardian if applicable) is aware that this is a billable service. Verbal consent to proceed has been obtained within the past 12 months. The visit was conducted pursuant to the emergency declaration under the [address] [place] [address] and the [employer] General Act. Patient identification was verified, and a caregiver was present when appropriate. The patient was located in a state where the provider was credentialed to provide care.       An electronic signature was used to authenticate this note.   -- Omelia Locus

## 2021-07-03 ENCOUNTER — HOSPITAL ENCOUNTER (EMERGENCY)
Age: 31
Discharge: HOME | End: 2021-07-03
Attending: EMERGENCY MEDICINE
Payer: COMMERCIAL

## 2021-07-03 VITALS
HEART RATE: 84 BPM | RESPIRATION RATE: 16 BRPM | DIASTOLIC BLOOD PRESSURE: 82 MMHG | BODY MASS INDEX: 34.6 KG/M2 | SYSTOLIC BLOOD PRESSURE: 138 MMHG | OXYGEN SATURATION: 97 % | HEIGHT: 67 IN | TEMPERATURE: 98 F | WEIGHT: 220.46 LBS

## 2021-07-03 DIAGNOSIS — K52.9 GASTROENTERITIS, ACUTE: Primary | ICD-10-CM

## 2021-07-03 DIAGNOSIS — E86.0 DEHYDRATION: ICD-10-CM

## 2021-07-03 LAB
ALBUMIN SERPL-MCNC: 3.8 G/DL (ref 3.5–5)
ALBUMIN/GLOB SERPL: 1 {RATIO} (ref 1.1–2.2)
ALP SERPL-CCNC: 94 U/L (ref 45–117)
ALT SERPL-CCNC: 22 U/L (ref 12–78)
ANION GAP SERPL CALC-SCNC: 10 MMOL/L (ref 5–15)
APPEARANCE UR: CLEAR
AST SERPL-CCNC: 15 U/L (ref 15–37)
BACTERIA URNS QL MICRO: ABNORMAL /HPF
BASOPHILS # BLD: 0 K/UL (ref 0–0.1)
BASOPHILS NFR BLD: 0 % (ref 0–1)
BILIRUB SERPL-MCNC: 0.6 MG/DL (ref 0.2–1)
BILIRUB UR QL: NEGATIVE
BUN SERPL-MCNC: 12 MG/DL (ref 6–20)
BUN/CREAT SERPL: 13 (ref 12–20)
CALCIUM SERPL-MCNC: 8.8 MG/DL (ref 8.5–10.1)
CHLORIDE SERPL-SCNC: 101 MMOL/L (ref 97–108)
CO2 SERPL-SCNC: 27 MMOL/L (ref 21–32)
COLOR UR: ABNORMAL
CREAT SERPL-MCNC: 0.89 MG/DL (ref 0.55–1.02)
DIFFERENTIAL METHOD BLD: ABNORMAL
EOSINOPHIL # BLD: 0.2 K/UL (ref 0–0.4)
EOSINOPHIL NFR BLD: 2 % (ref 0–7)
EPITH CASTS URNS QL MICRO: ABNORMAL /LPF
ERYTHROCYTE [DISTWIDTH] IN BLOOD BY AUTOMATED COUNT: 15.4 % (ref 11.5–14.5)
GLOBULIN SER CALC-MCNC: 4 G/DL (ref 2–4)
GLUCOSE SERPL-MCNC: 84 MG/DL (ref 65–100)
GLUCOSE UR STRIP.AUTO-MCNC: NEGATIVE MG/DL
HCG UR QL: NEGATIVE
HCT VFR BLD AUTO: 35.8 % (ref 35–47)
HGB BLD-MCNC: 11.1 G/DL (ref 11.5–16)
HGB UR QL STRIP: NEGATIVE
IMM GRANULOCYTES # BLD AUTO: 0 K/UL (ref 0–0.04)
IMM GRANULOCYTES NFR BLD AUTO: 0 % (ref 0–0.5)
KETONES UR QL STRIP.AUTO: 40 MG/DL
LEUKOCYTE ESTERASE UR QL STRIP.AUTO: ABNORMAL
LIPASE SERPL-CCNC: 89 U/L (ref 73–393)
LYMPHOCYTES # BLD: 1.5 K/UL (ref 0.8–3.5)
LYMPHOCYTES NFR BLD: 18 % (ref 12–49)
MCH RBC QN AUTO: 24.7 PG (ref 26–34)
MCHC RBC AUTO-ENTMCNC: 31 G/DL (ref 30–36.5)
MCV RBC AUTO: 79.7 FL (ref 80–99)
MONOCYTES # BLD: 0.8 K/UL (ref 0–1)
MONOCYTES NFR BLD: 10 % (ref 5–13)
MUCOUS THREADS URNS QL MICRO: ABNORMAL /LPF
NEUTS SEG # BLD: 5.6 K/UL (ref 1.8–8)
NEUTS SEG NFR BLD: 70 % (ref 32–75)
NITRITE UR QL STRIP.AUTO: NEGATIVE
NRBC # BLD: 0 K/UL (ref 0–0.01)
NRBC BLD-RTO: 0 PER 100 WBC
PH UR STRIP: 6 [PH] (ref 5–8)
PLATELET # BLD AUTO: 444 K/UL (ref 150–400)
PMV BLD AUTO: 9.1 FL (ref 8.9–12.9)
POTASSIUM SERPL-SCNC: 3.3 MMOL/L (ref 3.5–5.1)
PROT SERPL-MCNC: 7.8 G/DL (ref 6.4–8.2)
PROT UR STRIP-MCNC: NEGATIVE MG/DL
RBC # BLD AUTO: 4.49 M/UL (ref 3.8–5.2)
RBC #/AREA URNS HPF: ABNORMAL /HPF (ref 0–5)
SODIUM SERPL-SCNC: 138 MMOL/L (ref 136–145)
SP GR UR REFRACTOMETRY: 1.02 (ref 1–1.03)
UA: UC IF INDICATED,UAUC: ABNORMAL
UROBILINOGEN UR QL STRIP.AUTO: 0.2 EU/DL (ref 0.2–1)
WBC # BLD AUTO: 8.1 K/UL (ref 3.6–11)
WBC URNS QL MICRO: ABNORMAL /HPF (ref 0–4)

## 2021-07-03 PROCEDURE — 36415 COLL VENOUS BLD VENIPUNCTURE: CPT

## 2021-07-03 PROCEDURE — 80053 COMPREHEN METABOLIC PANEL: CPT

## 2021-07-03 PROCEDURE — 81025 URINE PREGNANCY TEST: CPT

## 2021-07-03 PROCEDURE — 81001 URINALYSIS AUTO W/SCOPE: CPT

## 2021-07-03 PROCEDURE — 74011250636 HC RX REV CODE- 250/636: Performed by: EMERGENCY MEDICINE

## 2021-07-03 PROCEDURE — 85025 COMPLETE CBC W/AUTO DIFF WBC: CPT

## 2021-07-03 PROCEDURE — 96374 THER/PROPH/DIAG INJ IV PUSH: CPT

## 2021-07-03 PROCEDURE — 96375 TX/PRO/DX INJ NEW DRUG ADDON: CPT

## 2021-07-03 PROCEDURE — 83690 ASSAY OF LIPASE: CPT

## 2021-07-03 PROCEDURE — 96361 HYDRATE IV INFUSION ADD-ON: CPT

## 2021-07-03 PROCEDURE — 99284 EMERGENCY DEPT VISIT MOD MDM: CPT

## 2021-07-03 RX ORDER — ONDANSETRON 8 MG/1
8 TABLET, ORALLY DISINTEGRATING ORAL
Qty: 15 TABLET | Refills: 0 | Status: SHIPPED | OUTPATIENT
Start: 2021-07-03 | End: 2021-11-15 | Stop reason: ALTCHOICE

## 2021-07-03 RX ORDER — KETOROLAC TROMETHAMINE 30 MG/ML
30 INJECTION, SOLUTION INTRAMUSCULAR; INTRAVENOUS
Status: COMPLETED | OUTPATIENT
Start: 2021-07-03 | End: 2021-07-03

## 2021-07-03 RX ORDER — ONDANSETRON 2 MG/ML
8 INJECTION INTRAMUSCULAR; INTRAVENOUS
Status: COMPLETED | OUTPATIENT
Start: 2021-07-03 | End: 2021-07-03

## 2021-07-03 RX ORDER — DICYCLOMINE HYDROCHLORIDE 20 MG/1
20 TABLET ORAL EVERY 6 HOURS
Qty: 20 TABLET | Refills: 0 | Status: SHIPPED | OUTPATIENT
Start: 2021-07-03 | End: 2022-05-08 | Stop reason: SDUPTHER

## 2021-07-03 RX ORDER — OMEPRAZOLE 20 MG/1
20 CAPSULE, DELAYED RELEASE ORAL DAILY
COMMUNITY
End: 2022-05-08

## 2021-07-03 RX ADMIN — SODIUM CHLORIDE 1000 ML: 9 INJECTION, SOLUTION INTRAVENOUS at 22:05

## 2021-07-03 RX ADMIN — KETOROLAC TROMETHAMINE 30 MG: 30 INJECTION, SOLUTION INTRAMUSCULAR; INTRAVENOUS at 22:04

## 2021-07-03 RX ADMIN — ONDANSETRON 8 MG: 2 INJECTION INTRAMUSCULAR; INTRAVENOUS at 22:04

## 2021-07-03 NOTE — Clinical Note
[facility]  [address]  [id number]  [phone]    Work/School Note    Date: 7/3/2021    To Whom It May concern:    Marilin Bailon was seen and treated today in the emergency room by the following provider(s):  Attending Provider: Connie Brandt MD.      Marilin Bailon is excused from work/school on 07/03/21 and 07/04/21. She is medically clear to return to work/school on 7/5/2021.        Sincerely,          Marce Hankins MD

## 2021-07-04 NOTE — ED PROVIDER NOTES
64-year-old female with a past medical history significant for arthritis, asthma, anxiety and depression who presents to the ED with 2-day history of intractable nausea, vomiting with watery nonbloody diarrhea. The patient also admits to abdominal cramps. The patient denies any fever, chills, headache, neck and back pain, chest pain, shortness of breath, dysuria, vaginal discharge or bleeding, extremity weakness or numbness, sick contact, skin rash, unusual food sources, recent travel, prior history of the same.            Past Medical History:   Diagnosis Date    Arthritis     Asthma     Psychiatric disorder     depression and anxiety       Past Surgical History:   Procedure Laterality Date    HX ORTHOPAEDIC      bilateral ankle surgery         Family History:   Problem Relation Age of Onset    Cancer Mother     Hypertension Father     Diabetes Father     Diabetes Brother     Asthma Maternal Grandmother     No Known Problems Paternal Grandmother     Hypertension Brother        Social History     Socioeconomic History    Marital status:      Spouse name: Not on file    Number of children: Not on file    Years of education: Not on file    Highest education level: Not on file   Occupational History    Not on file   Tobacco Use    Smoking status: Never Smoker    Smokeless tobacco: Never Used   Substance and Sexual Activity    Alcohol use: Yes     Comment: occ/socially-    Drug use: Yes     Types: Marijuana     Comment: Recreational/Insomnia     Sexual activity: Yes     Partners: Female     Birth control/protection: None   Other Topics Concern    Not on file   Social History Narrative    ** Merged History Encounter **          Social Determinants of Health     Financial Resource Strain:     Difficulty of Paying Living Expenses:    Food Insecurity:     Worried About Running Out of Food in the Last Year:     [address] in the Last Year:    Transportation Needs:     Lack of Transportation (Medical):  Lack of Transportation (Non-Medical):    Physical Activity:     Days of Exercise per Week:     Minutes of Exercise per Session:    Stress:     Feeling of Stress :    Social Connections:     Frequency of Communication with Friends and Family:     Frequency of Social Gatherings with Friends and Family:     Attends Mormonism Services:     Active Member of Clubs or Organizations:     Attends Club or Organization Meetings:     Marital Status:    Intimate Partner Violence:     Fear of Current or Ex-Partner:     Emotionally Abused:     Physically Abused:     Sexually Abused: ALLERGIES: Aspirin, Nut - unspecified, Peanut, Penicillins, Percocet [oxycodone-acetaminophen], Aspirin, Codeine, Pcn [penicillins], Percocet [oxycodone-acetaminophen], and Sulfa (sulfonamide antibiotics)    Review of Systems   All other systems reviewed and are negative. Vitals:    07/03/21 2145 07/03/21 2200 07/03/21 2215 07/03/21 2230   BP: (!) 135/90  136/74 138/82   Pulse:    84   Resp:       Temp:    98 °F (36.7 °C)   SpO2: 97% 100% 97% 97%   Weight:       Height:                Physical Exam  Vitals and nursing note reviewed. Exam conducted with a chaperone present. CONSTITUTIONAL: Well-appearing; well-nourished; in no apparent distress  HEAD: Normocephalic; atraumatic  EYES: PERRL; EOM intact; conjunctiva and sclera are clear bilaterally. ENT: No rhinorrhea; normal pharynx with no tonsillar hypertrophy; mucous membranes pink/moist, no erythema, no exudate. NECK: Supple; non-tender; no cervical lymphadenopathy  CARD: Normal S1, S2; no murmurs, rubs, or gallops. Regular rate and rhythm. RESP: Normal respiratory effort; breath sounds clear and equal bilaterally; no wheezes, rhonchi, or rales. ABD: Normal bowel sounds; non-distended; non-tender; no palpable organomegaly, no masses, no bruits. Back Exam: Normal inspection; no vertebral point tenderness, no CVA tenderness.  Normal range of motion. EXT: Normal ROM in all four extremities; non-tender to palpation; no swelling or deformity; distal pulses are normal, no edema. SKIN: Warm; dry; no rash. NEURO:Alert and oriented x 3, coherent, PARIS-XII grossly intact, sensory and motor are non-focal.      MDM  Number of Diagnoses or Management Options  Dehydration  Gastroenteritis, acute  Diagnosis management comments: Assessment: Acute gastroenteritis rule out electrolyte abnormality and dehydration. The patient has a fairly benign exam with stable vital signs and looks well. Plan: Lab/IV fluid/antiemetic and analgesia/p.o. challenge and serial exam/ Monitor and Reevaluate. Amount and/or Complexity of Data Reviewed  Clinical lab tests: ordered and reviewed  Tests in the radiology section of CPT®: ordered and reviewed  Tests in the medicine section of CPT®: ordered and reviewed  Discussion of test results with the performing providers: yes  Decide to obtain previous medical records or to obtain history from someone other than the patient: yes  Obtain history from someone other than the patient: yes  Review and summarize past medical records: yes  Discuss the patient with other providers: yes  Independent visualization of images, tracings, or specimens: yes    Risk of Complications, Morbidity, and/or Mortality  Presenting problems: moderate  Diagnostic procedures: moderate  Management options: moderate           Procedures    Progress Note:   Pt has been reexamined by Owen Zamora MD. Pt is feeling much better. Symptoms have improved. All available results have been reviewed with pt and any available family. The patient was given p.o. challenge that she tolerated well. She denies any further discomfort. Pt understands sx, dx, and tx in ED. Care plan has been outlined and questions have been answered. Pt is ready to go home. Will send home on acute gastroenteritis and oral rehydration education. Prescription Bentyl and Zofran. outpatient referral with PCP as needed. Written by Brigid Barros MD,4:10 AM    .   .

## 2021-07-06 ENCOUNTER — PATIENT OUTREACH (OUTPATIENT)
Dept: CASE MANAGEMENT | Age: 31
End: 2021-07-06

## 2021-07-09 ENCOUNTER — PATIENT OUTREACH (OUTPATIENT)
Dept: CASE MANAGEMENT | Age: 31
End: 2021-07-09

## 2021-08-06 ENCOUNTER — PATIENT OUTREACH (OUTPATIENT)
Dept: CASE MANAGEMENT | Age: 31
End: 2021-08-06

## 2021-08-06 DIAGNOSIS — N30.90 CYSTITIS: Primary | ICD-10-CM

## 2021-08-06 RX ORDER — NITROFURANTOIN 25; 75 MG/1; MG/1
100 CAPSULE ORAL 2 TIMES DAILY
Qty: 14 CAPSULE | Refills: 0 | Status: SHIPPED | OUTPATIENT
Start: 2021-08-06 | End: 2021-08-13

## 2021-08-06 NOTE — PROGRESS NOTES
ACM attempted to reach patient for CM . Unable to reach patient and left VM for return call with contact info provided for return call. Will attempt to reach patient again in the next 3-5 days if no return call    Ambulatory Care Management Note    Date/Time:  8/6/2021 3:41 PM    This patient was received as a referral from [facility] [employer]. Ambulatory Care Manager outreached to patient today to offer care management services. Introduction to self and role of care manager provided. Patient accepted care management services at this time. Follow up call scheduled at this time. Patient has Ambulatory Care Manager's contact number for for any questions or concerns. Patient seen in ER 7/2 noted to have Leukocytes, bacteria and mucus on UA and seen in ER for nausea/ vomiting. Culture not done and not tx'd with ABX. Patient reports since then she has had some urinary frequency and odor . No further N/V. [facility] will send a message to PCP to see if ABX should be ordered or if patient should see PCP in office.      [facility] will follow up on Monday

## 2021-08-09 ENCOUNTER — PATIENT OUTREACH (OUTPATIENT)
Dept: CASE MANAGEMENT | Age: 31
End: 2021-08-09

## 2021-08-09 NOTE — PROGRESS NOTES
ACM left patient a VM to notify her that [address] was sent in for her per PCP for UTI and that she needs to schedule a follow up with PCP. Asked that she return call to this ACM to notified that she did receive the call.

## 2021-08-10 ENCOUNTER — PATIENT OUTREACH (OUTPATIENT)
Dept: CASE MANAGEMENT | Age: 31
End: 2021-08-10

## 2021-08-10 NOTE — PROGRESS NOTES
ACM contacted patient to verify that she received the message regarding Macrobid sent to pharmacy and the need to schedule a PCP follow up.  Unable to reach the patient again and left another message for return call with contact info provided

## 2021-08-16 ENCOUNTER — PATIENT OUTREACH (OUTPATIENT)
Dept: CASE MANAGEMENT | Age: 31
End: 2021-08-16

## 2021-08-16 RX ORDER — MONTELUKAST SODIUM 10 MG/1
10 TABLET ORAL DAILY
COMMUNITY
End: 2021-08-19 | Stop reason: SDUPTHER

## 2021-08-16 NOTE — PROGRESS NOTES
ACM received VM from patient and returned call. Left  for return call with contact info provided    Ambulatory Care Management Note    Date/Time:  8/16/2021 1:16 PM    This [facility] Ambulatory Care Manager outreached to patient today to offer care management services. Introduction to self and role of care manager provided. Patient accepted care management services at this time. Follow up call scheduled at this time. Patient has Ambulatory Care Manager's contact number for for any questions or concerns. Patient notified that PCP sent [facility] to the pharmacy on 8/6/21 for her. Patient reports still having urinary frequency and foul urine odor. She will  and start this today. Recommended drinking plenty of water and scheduling PCP follow up after completing ABX to recheck urine. Patient will call to schedule. Patient reports asthma has worsened lately with increased humidity. Discussed numerous allergies and ways to reduce allergy related symptoms to limit effects of asthma. Patient requests refills on Singulair, Albuterol and Advair. AC will ask PCP to send these in to the pharmacy. [facility] provided patient AC's contact info. Will follow up with patient in 1-2 weeks to see how she is doing.

## 2021-08-19 DIAGNOSIS — J45.40 MODERATE PERSISTENT ASTHMA WITHOUT COMPLICATION: Primary | ICD-10-CM

## 2021-08-19 RX ORDER — ALBUTEROL SULFATE 90 UG/1
1 AEROSOL, METERED RESPIRATORY (INHALATION)
Qty: 1 INHALER | Refills: 1 | Status: SHIPPED | OUTPATIENT
Start: 2021-08-19 | End: 2021-11-15 | Stop reason: ALTCHOICE

## 2021-08-19 RX ORDER — FLUTICASONE PROPIONATE AND SALMETEROL 250; 50 UG/1; UG/1
1 POWDER RESPIRATORY (INHALATION) EVERY 12 HOURS
Qty: 60 EACH | Refills: 2 | Status: SHIPPED | OUTPATIENT
Start: 2021-08-19 | End: 2021-11-26

## 2021-08-19 RX ORDER — MONTELUKAST SODIUM 10 MG/1
10 TABLET ORAL DAILY
Qty: 90 TABLET | Refills: 0 | Status: SHIPPED | OUTPATIENT
Start: 2021-08-19 | End: 2021-12-05

## 2021-08-20 ENCOUNTER — PATIENT OUTREACH (OUTPATIENT)
Dept: CASE MANAGEMENT | Age: 31
End: 2021-08-20

## 2021-08-20 NOTE — PROGRESS NOTES
ACM left patient a message to notify her that requested medications were sent to the pharmacy . ACM also asked that she schedule a follow up with PCP as discussed. Contact infor provided for return call if needed.

## 2021-08-23 DIAGNOSIS — R35.0 FREQUENT URINATION: Primary | ICD-10-CM

## 2021-08-24 LAB
APPEARANCE UR: ABNORMAL
BACTERIA URNS QL MICRO: NEGATIVE /HPF
BILIRUB UR QL: NEGATIVE
COLOR UR: ABNORMAL
EPITH CASTS URNS QL MICRO: ABNORMAL /LPF
GLUCOSE UR STRIP.AUTO-MCNC: NEGATIVE MG/DL
HGB UR QL STRIP: NEGATIVE
KETONES UR QL STRIP.AUTO: NEGATIVE MG/DL
LEUKOCYTE ESTERASE UR QL STRIP.AUTO: ABNORMAL
MUCOUS THREADS URNS QL MICRO: ABNORMAL /LPF
NITRITE UR QL STRIP.AUTO: NEGATIVE
PH UR STRIP: 5.5 [PH] (ref 5–8)
PROT UR STRIP-MCNC: NEGATIVE MG/DL
RBC #/AREA URNS HPF: ABNORMAL /HPF (ref 0–5)
SP GR UR REFRACTOMETRY: 1.02 (ref 1–1.03)
UA: UC IF INDICATED,UAUC: ABNORMAL
UROBILINOGEN UR QL STRIP.AUTO: 0.2 EU/DL (ref 0.2–1)
WBC URNS QL MICRO: ABNORMAL /HPF (ref 0–4)

## 2021-09-13 ENCOUNTER — PATIENT OUTREACH (OUTPATIENT)
Dept: CASE MANAGEMENT | Age: 31
End: 2021-09-13

## 2021-09-13 NOTE — PROGRESS NOTES
ACM received VM from patient with concerns that her inhaler was not the same as usual when picked up from the pharmacy. EMR reviewed. Noted that albuterol inhaler was the same inhaler as ordered previously. Noted that Advair inhaler now Wixela versus Diskus. Discussed with Dr Imtiaz Garcia who provided that this is correct / Radha Benders is the generic for Advair . Left patient a VM with this information and contact info if she has any further questions.

## 2021-09-24 DIAGNOSIS — F41.9 ANXIETY: ICD-10-CM

## 2021-09-24 RX ORDER — CITALOPRAM 20 MG/1
TABLET, FILM COATED ORAL
Qty: 90 TABLET | Refills: 0 | Status: SHIPPED | OUTPATIENT
Start: 2021-09-24 | End: 2021-12-26

## 2021-10-12 ENCOUNTER — PATIENT OUTREACH (OUTPATIENT)
Dept: CASE MANAGEMENT | Age: 31
End: 2021-10-12

## 2021-10-12 NOTE — PROGRESS NOTES
ACM reviewed EMR for CM follow up. Noted patients recently scheduled visit with PCP's office was no show. ACM attempted to reach patient for CM follow up. Unable to reach patient and left VM for return call with contact info provided    ACM will try to reach patient again in a week or so.

## 2021-10-13 ENCOUNTER — PATIENT OUTREACH (OUTPATIENT)
Dept: CASE MANAGEMENT | Age: 31
End: 2021-10-13

## 2021-11-05 ENCOUNTER — PATIENT OUTREACH (OUTPATIENT)
Dept: CASE MANAGEMENT | Age: 31
End: 2021-11-05

## 2021-11-05 NOTE — PROGRESS NOTES
ACM spoke with patient for CM follow up. Patient report has had some challenges with Asthma since weather has started to change. New albuterol  and inhaler doesn't seem to work quite as well. Patient also notes she has had to take a leave of absence from work due to anxiety and difficulty with focusing, worsening depression. She is currently seeing a therapist Noe Torres with [facility] for counseling but has to pay self pay for these visits. She has had a very difficult time finding a psychiatrist within her insurance network that can see her before next year. Offered her an appt with PCP in the interim . States she has had less than positive experiences with [facility] and would prefer not to return there. [facility] provided several possible mental health practices to try . She had already contacted her insurance and called the practices they provided as in  Network but none were available and accepting patients until well after the new year. She will try calling the practices provided today, [facility] and [facility] . ACM will follow up in 1-2 weeks. Goals Addressed                    This Visit's Progress      assist patient with finding psychiatric care (pt-stated)         11/5/21- patient is in search of a psychiatrist has contacted in network providers given to her by insurance and none available to accept her as a patient until after the start of 2022. Provided several practices for her to try. ACM will follow up in 1-2 weeks to see if she has been able to schedule an appt. LN        Patient verbalizes understanding of self -management goals of living with Asthma. 11/5/21- Discussed that temperature changes, colder weather can be triggers for asthma, to try to cover face when entering colder air. Use inhalers PRN (albuterol) and Advair on schedule.  ACM will follow up in 1-2 weeks to see how patient is managing with season change and asthma sx's.  LN

## 2021-11-15 ENCOUNTER — VIRTUAL VISIT (OUTPATIENT)
Dept: PRIMARY CARE CLINIC | Age: 31
End: 2021-11-15
Payer: COMMERCIAL

## 2021-11-15 ENCOUNTER — PATIENT OUTREACH (OUTPATIENT)
Dept: CASE MANAGEMENT | Age: 31
End: 2021-11-15

## 2021-11-15 DIAGNOSIS — J45.40 MODERATE PERSISTENT ASTHMA WITHOUT COMPLICATION: ICD-10-CM

## 2021-11-15 DIAGNOSIS — F41.9 ANXIETY AND DEPRESSION: ICD-10-CM

## 2021-11-15 DIAGNOSIS — R51.9 SINUS HEADACHE: ICD-10-CM

## 2021-11-15 DIAGNOSIS — F32.A ANXIETY AND DEPRESSION: ICD-10-CM

## 2021-11-15 DIAGNOSIS — J01.01 ACUTE RECURRENT MAXILLARY SINUSITIS: Primary | ICD-10-CM

## 2021-11-15 PROCEDURE — 99214 OFFICE O/P EST MOD 30 MIN: CPT | Performed by: INTERNAL MEDICINE

## 2021-11-15 RX ORDER — ALBUTEROL SULFATE 90 UG/1
1 AEROSOL, METERED RESPIRATORY (INHALATION)
Qty: 18 G | Refills: 0 | Status: SHIPPED | OUTPATIENT
Start: 2021-11-15 | End: 2022-04-28

## 2021-11-15 RX ORDER — BUSPIRONE HYDROCHLORIDE 5 MG/1
5 TABLET ORAL 2 TIMES DAILY
Qty: 60 TABLET | Refills: 0 | Status: SHIPPED | OUTPATIENT
Start: 2021-11-15 | End: 2021-12-07 | Stop reason: SDUPTHER

## 2021-11-15 RX ORDER — AZITHROMYCIN 250 MG/1
TABLET, FILM COATED ORAL
Qty: 6 TABLET | Refills: 0 | Status: SHIPPED | OUTPATIENT
Start: 2021-11-15 | End: 2021-11-20

## 2021-11-15 RX ORDER — METHYLPREDNISOLONE 4 MG/1
TABLET ORAL
Qty: 1 DOSE PACK | Refills: 0 | Status: SHIPPED | OUTPATIENT
Start: 2021-11-15 | End: 2021-12-13 | Stop reason: ALTCHOICE

## 2021-11-15 NOTE — PROGRESS NOTES
Jessi Allison (: 1990) is a 33 y.o. female, established patient, here for evaluation of the following chief complaint(s):   Sinus Infection     Written by Denisha Steel, as dictated by Dr. Stefano Christine MD.      ASSESSMENT/PLAN:  Below is the assessment and plan developed based on review of pertinent history, labs, studies, and medications. 1. Acute recurrent maxillary sinusitis  Prescribed azithromycin 250 mg, take as prescribed. Potential side effects were discussed. -     azithromycin (ZITHROMAX) 250 mg tablet; use as directed, Normal, Disp-6 Tablet, R-0 sent to pharmacy. 2. Moderate persistent asthma without complication  Continue using an Advair 250-50 mcg inhaler and an albuterol 90 mcg inhaler as prescribed. -     albuterol (PROVENTIL HFA, VENTOLIN HFA, PROAIR HFA) 90 mcg/actuation inhaler; Take 1 Puff by inhalation every six (6) hours as needed for Wheezing for up to 30 days. , Normal, Disp-18 g, R-0Red bottle inhaler sent to pharmacy. 3. Sinus headache  Prescribed Medrol Dosepack 4 mg, take as prescribed. Potential side effects were discussed. -     methylPREDNISolone (MEDROL DOSEPACK) 4 mg tablet; As directed, Normal, Disp-1 Dose Pack, R-0 sent to pharmacy. 4. Anxiety and depression  Continue taking Celexa 20 mg as prescribed. Do not take this medication at the same time as azithromycin 250 mg. Prescribed Buspar 5 mg, take 1 tab BID as prescribed. Potential side effects were discussed. Let me know how the appointment with [facility] goes. -     busPIRone (BUSPAR) 5 mg tablet; Take 1 Tablet by mouth two (2) times a day for 30 days. , Normal, Disp-60 Tablet, R-0 sent to pharmacy. SUBJECTIVE/OBJECTIVE:  HPI    Patient presents today virtually c/o congestion, sinus pressure, headache, and post nasal drip x 3 days. She denies any fever or chills. She uses an Advair 250-50 mcg inhaler and an albuterol 90 mcg inhaler for asthma.  She notes she has two albuterol 90 mcg inhalers and one works better than the other, the brand name one is one that works better. She denies any asthma attacks recently. She notes occasional wheezing but she is able to control it with the inhalers. She takes Celexa 20 mg for anxiety. She notes the medication does not work well and her depression has been severe lately. She has been trying to see a Psychiatrist since July and has been struggling to find someone. She has an appointment with [facility] today, but she is not sure who she is seeing or if she will be prescribed any medication today. She has not received a COVID-19 vaccine and she does not plan on receiving a vaccine. Patient Active Problem List   Diagnosis Code    Moderate persistent asthma without complication Q47.77    Anxiety F41.9    Psychiatric disorder F99        Current Outpatient Medications on File Prior to Visit   Medication Sig Dispense Refill    citalopram (CELEXA) 20 mg tablet TAKE 1 TABLET BY MOUTH EVERY DAY 90 Tablet 0    montelukast (Singulair) 10 mg tablet Take 1 Tablet by mouth daily for 90 days. 90 Tablet 0    fluticasone propion-salmeteroL (ADVAIR/WIXELA) 250-50 mcg/dose diskus inhaler Take 1 Puff by inhalation every twelve (12) hours for 90 days. 60 Each 2    [DISCONTINUED] albuterol (PROVENTIL HFA, VENTOLIN HFA, PROAIR HFA) 90 mcg/actuation inhaler Take 1 Puff by inhalation every six (6) hours as needed for Wheezing for up to 90 days. 1 Inhaler 1    omeprazole (PRILOSEC) 20 mg capsule Take 20 mg by mouth daily.  dicyclomine (BENTYL) 20 mg tablet Take 1 Tablet by mouth every six (6) hours. 20 Tablet 0    [DISCONTINUED] ondansetron (Zofran ODT) 8 mg disintegrating tablet Take 1 Tablet by mouth every eight (8) hours as needed for Nausea or Vomiting. 15 Tablet 0    omeprazole (PRILOSEC) 40 mg capsule Take 1 Cap by mouth daily.  30 Cap 0    [DISCONTINUED] fluticasone propion-salmeteroL (Advair Diskus) 250-50 mcg/dose diskus inhaler TAKE 1 PUFF BY MOUTH EVERY 12 HOURS 1 Each 1    cyclobenzaprine (FLEXERIL) 10 mg tablet Take 10 mg by mouth three (3) times daily as needed.  [DISCONTINUED] albuterol (PROVENTIL HFA, VENTOLIN HFA, PROAIR HFA) 90 mcg/actuation inhaler INHALE 1 PUFF BY MOUTH EVERY 6 HOURS AS NEEDED FOR WHEEZING 18 Inhaler 0    ascorbic acid, vitamin C, (VITAMIN C) 1,000 mg tablet Take 1,000 mg by mouth.  ergocalciferol (VITAMIN D2) 50,000 unit capsule Take 50,000 Units by mouth.  Biotin 2,500 mcg cap Take  by mouth.  vitamin e (E GEMS) 100 unit capsule Take  by mouth daily.  zinc oxide-cod liver oil (DESITIN) 40 % ointment Apply  to affected area as needed for Skin Irritation.  folic acid-vit W6-JKG B76 2.2-25-1 mg tab Take  by mouth. No current facility-administered medications on file prior to visit.        Allergies   Allergen Reactions    Aspirin Nausea and Vomiting    Nut - Unspecified Anaphylaxis    Peanut Anaphylaxis     And tree nuts    Penicillins Rash    Percocet [Oxycodone-Acetaminophen] Itching    Aspirin Other (comments)     Muscle spasms    Codeine Itching    Pcn [Penicillins] Unknown (comments)    Percocet [Oxycodone-Acetaminophen] Rash    Sulfa (Sulfonamide Antibiotics) Unknown (comments)     States it was a childhood reaction does not remember       Past Medical History:   Diagnosis Date    Arthritis     Asthma     Psychiatric disorder     depression and anxiety       Past Surgical History:   Procedure Laterality Date    HX ORTHOPAEDIC      bilateral ankle surgery       Family History   Problem Relation Age of Onset    Cancer Mother     Hypertension Father     Diabetes Father     Diabetes Brother     Asthma Maternal Grandmother     No Known Problems Paternal Grandmother     Hypertension Brother        Social History     Socioeconomic History    Marital status:      Spouse name: Not on file    Number of children: Not on file    Years of education: Not on file    Highest education level: Not on file   Occupational History    Not on file   Tobacco Use    Smoking status: Never Smoker    Smokeless tobacco: Never Used   Substance and Sexual Activity    Alcohol use: Yes     Comment: occ/socially-    Drug use: Yes     Types: Marijuana     Comment: Recreational/Insomnia     Sexual activity: Yes     Partners: Female     Birth control/protection: None   Other Topics Concern    Not on file   Social History Narrative    ** Merged History Encounter **          Social Determinants of Health     Financial Resource Strain:     Difficulty of Paying Living Expenses: Not on file   Food Insecurity:     Worried About Running Out of Food in the Last Year: Not on file    Dajuan of Food in the Last Year: Not on file   Transportation Needs:     Lack of Transportation (Medical): Not on file    Lack of Transportation (Non-Medical):  Not on file   Physical Activity:     Days of Exercise per Week: Not on file    Minutes of Exercise per Session: Not on file   Stress:     Feeling of Stress : Not on file   Social Connections:     Frequency of Communication with Friends and Family: Not on file    Frequency of Social Gatherings with Friends and Family: Not on file    Attends Anabaptism Services: Not on file    Active Member of [address] [place] [address] [employer] or Organizations: Not on file    Attends Club or Organization Meetings: Not on file    Marital Status: Not on file   Intimate Partner Violence:     Fear of Current or Ex-Partner: Not on file    Emotionally Abused: Not on file    Physically Abused: Not on file    Sexually Abused: Not on file   Housing Stability:     Unable to Pay for Housing in the Last Year: Not on file    Number of [place] in the Last Year: Not on file    Unstable Housing in the Last Year: Not on file       Orders Only on 08/23/2021   Component Date Value Ref Range Status    Color 08/23/2021 YELLOW/STRAW    Final    Color Reference Range: Straw, Yellow or Dark Yellow    Appearance 08/23/2021 TURBID* CLEAR   Final    Specific gravity 08/23/2021 1.023  1.003 - 1.030   Final    pH (UA) 08/23/2021 5.5  5.0 - 8.0   Final    Protein 08/23/2021 Negative  Negative mg/dL Final    Glucose 08/23/2021 Negative  Negative mg/dL Final    Ketone 08/23/2021 Negative  Negative mg/dL Final    Bilirubin 08/23/2021 Negative  Negative   Final    Blood 08/23/2021 Negative  Negative   Final    Urobilinogen 08/23/2021 0.2  0.2 - 1.0 EU/dL Final    Nitrites 08/23/2021 Negative  Negative   Final    Leukocyte Esterase 08/23/2021 MODERATE* Negative   Final    WBC 08/23/2021 5-10  0 - 4 /hpf Final    RBC 08/23/2021 0-5  0 - 5 /hpf Final    Epithelial cells 08/23/2021 FEW  FEW /lpf Final    Comment: Epithelial cell category consists of squamous cells and /or transitional  urothelial cells. Renal tubular cells, if present, are separately identified as  such.  Bacteria 08/23/2021 Negative  Negative /hpf Final    UA:UC IF INDICATED 08/23/2021 CULTURE NOT INDICATED BY UA RESULT  CULTURE NOT INDICATED BY UA RESULT   Final    Mucus 08/23/2021 TRACE* Negative /lpf Final       Review of Systems   Constitutional: Negative for activity change, fatigue and unexpected weight change. HENT: Positive for congestion, postnasal drip and sinus pressure. Negative for hearing loss, rhinorrhea and sore throat. Eyes: Negative for discharge. Respiratory: Negative for cough, chest tightness and shortness of breath. Cardiovascular: Negative for leg swelling. Gastrointestinal: Negative for abdominal pain, constipation and diarrhea. Genitourinary: Negative for dysuria, flank pain, frequency and urgency. Musculoskeletal: Negative for arthralgias, back pain and myalgias. Skin: Negative for color change and rash. Neurological: Positive for headaches. Negative for dizziness and light-headedness. Psychiatric/Behavioral: Negative for dysphoric mood and sleep disturbance.  The patient is not nervous/anxious. Physical Exam    [INSTRUCTIONS:  \"[x]\" Indicates a positive item  \"[]\" Indicates a negative item  -- DELETE ALL ITEMS NOT EXAMINED]    Constitutional: [x] Appears well-developed and well-nourished [x] No apparent distress      [] Abnormal -     Mental status: [x] Alert and awake  [x] Oriented to person/place/time [x] Able to follow commands    [] Abnormal -     Eyes:   EOM    [x]  Normal    [] Abnormal -   Sclera  [x]  Normal    [] Abnormal -          Discharge [x]  None visible   [] Abnormal -     HENT: [x] Normocephalic, atraumatic  [] Abnormal -   [x] Mouth/Throat: Mucous membranes are moist    External Ears [x] Normal  [] Abnormal -    Neck: [x] No visualized mass [] Abnormal -     Pulmonary/Chest: [x] Respiratory effort normal   [x] No visualized signs of difficulty breathing or respiratory distress        [] Abnormal -      Musculoskeletal:   [x] Normal gait with no signs of ataxia         [x] Normal range of motion of neck        [] Abnormal -     Neurological:        [x] No Facial Asymmetry (Cranial nerve 7 motor function) (limited exam due to video visit)          [x] No gaze palsy        [] Abnormal -          Skin:        [x] No significant exanthematous lesions or discoloration noted on facial skin         [] Abnormal -            Psychiatric:       [x] Normal Affect [] Abnormal -        [x] No Hallucinations    Other pertinent observable physical exam findings:-        Ousmane Rolon, was evaluated through a synchronous (real-time) audio-video encounter. The patient (or guardian if applicable) is aware that this is a billable service. Verbal consent to proceed has been obtained within the past 12 months. The visit was conducted pursuant to the emergency declaration under the [facility], [id number] waiver authority and the [employer] General Act.   Patient identification was verified, and a caregiver was present when appropriate. The patient was located in a state where the provider was credentialed to provide care. An electronic signature was used to authenticate this note.   -- Kylee Peter

## 2021-11-15 NOTE — PROGRESS NOTES
Goals Addressed                    This Visit's Progress      assist patient with finding psychiatric care (pt-stated)         11/15/21- Patient reports was unable to schedule with the psychiatrists that this AC provided no appts available. Patient reports beginning to feel that her depression is debilitating. She is still on a leave from work due to this. Feels she needs a different medication to help / discussed that she could d/w PCP until new psychiatry care can be established. Referred her to go today as a same day walk in to [facility]. Also provided info for Crisis number if needed which is available 24/7. Patient agrees to go today for evaluation. ACM will follow up in a week or so to see how patient is doing. LN    11/5/21- patient is in search of a psychiatrist has contacted in network providers given to her by insurance and none available to accept her as a patient until after the start of 2022. Provided several practices for her to try. ACM will follow up in 1-2 weeks to see if she has been able to schedule an appt. LN            Patient contacted ACM c/o feeling that she needed treatment for a sinus infection. Requested VV with PCP. ACM contacted PCP and nurse and this was scheduled this morning for patient to be seen by PCP. No further questions.

## 2021-11-26 DIAGNOSIS — J45.40 MODERATE PERSISTENT ASTHMA WITHOUT COMPLICATION: ICD-10-CM

## 2021-11-26 RX ORDER — FLUTICASONE PROPIONATE AND SALMETEROL 50; 250 UG/1; UG/1
POWDER RESPIRATORY (INHALATION)
Qty: 180 EACH | Refills: 0 | Status: SHIPPED | OUTPATIENT
Start: 2021-11-26 | End: 2022-02-20

## 2021-12-05 DIAGNOSIS — J45.40 MODERATE PERSISTENT ASTHMA WITHOUT COMPLICATION: ICD-10-CM

## 2021-12-05 RX ORDER — MONTELUKAST SODIUM 10 MG/1
TABLET ORAL
Qty: 90 TABLET | Refills: 0 | Status: SHIPPED | OUTPATIENT
Start: 2021-12-05 | End: 2022-03-01

## 2021-12-07 DIAGNOSIS — F32.A ANXIETY AND DEPRESSION: ICD-10-CM

## 2021-12-07 DIAGNOSIS — F41.9 ANXIETY AND DEPRESSION: ICD-10-CM

## 2021-12-08 RX ORDER — BUSPIRONE HYDROCHLORIDE 5 MG/1
5 TABLET ORAL 2 TIMES DAILY
Qty: 60 TABLET | Refills: 0 | Status: SHIPPED | OUTPATIENT
Start: 2021-12-08 | End: 2021-12-13 | Stop reason: ALTCHOICE

## 2021-12-08 NOTE — TELEPHONE ENCOUNTER
Requested Prescriptions     Pending Prescriptions Disp Refills    busPIRone (BUSPAR) 5 mg tablet 60 Tablet 0     Sig: Take 1 Tablet by mouth two (2) times a day for 30 days.         Last Visit 11/15/21  Last Refill 11/15/21

## 2021-12-13 ENCOUNTER — VIRTUAL VISIT (OUTPATIENT)
Dept: PRIMARY CARE CLINIC | Age: 31
End: 2021-12-13
Payer: COMMERCIAL

## 2021-12-13 DIAGNOSIS — F31.73 BIPOLAR DISORDER, IN PARTIAL REMISSION, MOST RECENT EPISODE MANIC (HCC): Primary | ICD-10-CM

## 2021-12-13 DIAGNOSIS — J45.40 MODERATE PERSISTENT ASTHMA WITHOUT COMPLICATION: ICD-10-CM

## 2021-12-13 DIAGNOSIS — F41.9 ANXIETY: ICD-10-CM

## 2021-12-13 PROCEDURE — 99214 OFFICE O/P EST MOD 30 MIN: CPT | Performed by: INTERNAL MEDICINE

## 2021-12-13 NOTE — PROGRESS NOTES
Bryant Rocha (: 1990) is a 33 y.o. female, established patient, here for evaluation of the following chief complaint(s):   Follow-up       ASSESSMENT/PLAN:  Below is the assessment and plan developed based on review of pertinent history, labs, studies, and medications. 1. Bipolar disorder, in partial remission, most recent episode manic ([address]  Doing well on Zoloft and hydroxyzine. BuSpar was discontinued. [facility] paper work completed and told her next time should get filled out from the psychiatrist.    2. Anxiety  Anxiety has improved since she has stopped taking Wellbutrin and BuSpar. 3. Moderate persistent asthma without complication  Stable on current regimen. SUBJECTIVE/OBJECTIVE:  Patient has recently started seeing a new psychiatrist [facility]  [phone]. She was diagnosed with the bipolar disorder and was told that BuSpar and Wellbutrin had made her francy worse that is why she was having severe anxiety attack. She was recently started on hydroxyzine 3 times daily and Zoloft daily she has started feeling much better. She would like me to fill out the [facility] paperwork for few more weeks until she starts feeling better on this medication and able to go out and interact with people. .     Patient Active Problem List   Diagnosis Code    Moderate persistent asthma without complication V05.17    Anxiety F41.9    Bipolar disorder, in partial remission, most recent episode manic ([facility]) F31.73        Current Outpatient Medications on File Prior to Visit   Medication Sig Dispense Refill    [DISCONTINUED] busPIRone (BUSPAR) 5 mg tablet Take 1 Tablet by mouth two (2) times a day for 30 days. 60 Tablet 0    montelukast (SINGULAIR) 10 mg tablet TAKE 1 TABLET BY MOUTH EVERY DAY 90 Tablet 0    Advair Diskus 250-50 mcg/dose diskus inhaler TAKE 1 INHALATION EVERY TWELVE (12) HOURS FOR 90 DAYS.  180 Each 0    albuterol (PROVENTIL HFA, VENTOLIN HFA, PROAIR HFA) 90 mcg/actuation inhaler Take 1 Puff by inhalation every six (6) hours as needed for Wheezing for up to 30 days. 18 g 0    [DISCONTINUED] methylPREDNISolone (MEDROL DOSEPACK) 4 mg tablet As directed 1 Dose Pack 0    citalopram (CELEXA) 20 mg tablet TAKE 1 TABLET BY MOUTH EVERY DAY 90 Tablet 0    omeprazole (PRILOSEC) 20 mg capsule Take 20 mg by mouth daily.  dicyclomine (BENTYL) 20 mg tablet Take 1 Tablet by mouth every six (6) hours. 20 Tablet 0    omeprazole (PRILOSEC) 40 mg capsule Take 1 Cap by mouth daily. 30 Cap 0    cyclobenzaprine (FLEXERIL) 10 mg tablet Take 10 mg by mouth three (3) times daily as needed.  ascorbic acid, vitamin C, (VITAMIN C) 1,000 mg tablet Take 1,000 mg by mouth.  ergocalciferol (VITAMIN D2) 50,000 unit capsule Take 50,000 Units by mouth.  Biotin 2,500 mcg cap Take  by mouth.  vitamin e (E GEMS) 100 unit capsule Take  by mouth daily.  zinc oxide-cod liver oil (DESITIN) 40 % ointment Apply  to affected area as needed for Skin Irritation.  folic acid-vit A9-[facility] E62 2.2-25-1 mg tab Take  by mouth. No current facility-administered medications on file prior to visit.        Allergies   Allergen Reactions    Aspirin Nausea and Vomiting    Nut - Unspecified Anaphylaxis    Peanut Anaphylaxis     And tree nuts    Penicillins Rash    Percocet [Oxycodone-Acetaminophen] Itching    Aspirin Other (comments)     Muscle spasms    Codeine Itching    Pcn [Penicillins] Unknown (comments)    Percocet [Oxycodone-Acetaminophen] Rash    Sulfa (Sulfonamide Antibiotics) Unknown (comments)     States it was a childhood reaction does not remember       Past Medical History:   Diagnosis Date    Arthritis     Asthma     Psychiatric disorder     depression and anxiety       Past Surgical History:   Procedure Laterality Date    HX ORTHOPAEDIC      bilateral ankle surgery       Family History   Problem Relation Age of Onset    Cancer Mother     Hypertension Father     Diabetes Father    Carolynn Andrews Diabetes Brother     Asthma Maternal Grandmother     No Known Problems Paternal Grandmother     Hypertension Brother        Social History     Socioeconomic History    Marital status:      Spouse name: Not on file    Number of children: Not on file    Years of education: Not on file    Highest education level: Not on file   Occupational History    Not on file   Tobacco Use    Smoking status: Never Smoker    Smokeless tobacco: Never Used   Substance and Sexual Activity    Alcohol use: Yes     Comment: occ/socially-    Drug use: Yes     Types: Marijuana     Comment: Recreational/Insomnia     Sexual activity: Yes     Partners: Female     Birth control/protection: None   Other Topics Concern    Not on file   Social History Narrative    ** Merged History Encounter **          Social Determinants of Health     Financial Resource Strain:     Difficulty of Paying Living Expenses: Not on file   Food Insecurity:     Worried About Running Out of Food in the Last Year: Not on file    Dajuan of Food in the Last Year: Not on file   Transportation Needs:     Lack of Transportation (Medical): Not on file    Lack of Transportation (Non-Medical):  Not on file   Physical Activity:     Days of Exercise per Week: Not on file    Minutes of Exercise per Session: Not on file   Stress:     Feeling of Stress : Not on file   Social Connections:     Frequency of Communication with Friends and Family: Not on file    Frequency of Social Gatherings with Friends and Family: Not on file    Attends Buddhism Services: Not on file    Active Member of Clubs or Organizations: Not on file    Attends Club or Organization Meetings: Not on file    Marital Status: Not on file   Intimate Partner Violence:     Fear of Current or Ex-Partner: Not on file    Emotionally Abused: Not on file    Physically Abused: Not on file    Sexually Abused: Not on file   Housing Stability:     Unable to Pay for Housing in the Last Year: Not on file    Number of Places Lived in the Last Year: Not on file    Unstable Housing in the Last Year: Not on file       No visits with results within 3 Month(s) from this visit. Latest known visit with results is:   Orders Only on 08/23/2021   Component Date Value Ref Range Status    Color 08/23/2021 YELLOW/STRAW    Final    Color Reference Range: Straw, Yellow or Dark Yellow    Appearance 08/23/2021 TURBID* CLEAR   Final    Specific gravity 08/23/2021 1.023  1.003 - 1.030   Final    pH (UA) 08/23/2021 5.5  5.0 - 8.0   Final    Protein 08/23/2021 Negative  Negative mg/dL Final    Glucose 08/23/2021 Negative  Negative mg/dL Final    Ketone 08/23/2021 Negative  Negative mg/dL Final    Bilirubin 08/23/2021 Negative  Negative   Final    Blood 08/23/2021 Negative  Negative   Final    Urobilinogen 08/23/2021 0.2  0.2 - 1.0 EU/dL Final    Nitrites 08/23/2021 Negative  Negative   Final    Leukocyte Esterase 08/23/2021 MODERATE* Negative   Final    WBC 08/23/2021 5-10  0 - 4 /hpf Final    RBC 08/23/2021 0-5  0 - 5 /hpf Final    Epithelial cells 08/23/2021 FEW  FEW /lpf Final    Comment: Epithelial cell category consists of squamous cells and /or transitional  urothelial cells. Renal tubular cells, if present, are separately identified as  such.  Bacteria 08/23/2021 Negative  Negative /hpf Final    UA:UC IF INDICATED 08/23/2021 CULTURE NOT INDICATED BY UA RESULT  CULTURE NOT INDICATED BY UA RESULT   Final    Mucus 08/23/2021 TRACE* Negative /lpf Final     Review of Systems   HENT: Negative for congestion and sinus pain. Respiratory: Negative for cough and shortness of breath. Cardiovascular: Negative for chest pain and palpitations. Gastrointestinal: Negative for abdominal pain and heartburn. Musculoskeletal: Negative for myalgias and neck pain. Neurological: Negative for dizziness and headaches. Psychiatric/Behavioral: Positive for depression.  Negative for hallucinations and suicidal ideas. The patient is nervous/anxious. Physical Exam    [INSTRUCTIONS:  \"[x]\" Indicates a positive item  \"[]\" Indicates a negative item  -- DELETE ALL ITEMS NOT EXAMINED]    Constitutional: [x] Appears well-developed and well-nourished [x] No apparent distress      [] Abnormal -     Mental status: [x] Alert and awake  [x] Oriented to person/place/time [x] Able to follow commands    [] Abnormal -     Eyes:   EOM    [x]  Normal    [] Abnormal -   Sclera  [x]  Normal    [] Abnormal -          Discharge [x]  None visible   [] Abnormal -     HENT: [x] Normocephalic, atraumatic  [] Abnormal -   [x] Mouth/Throat: Mucous membranes are moist    External Ears [x] Normal  [] Abnormal -    Neck: [x] No visualized mass [] Abnormal -     Pulmonary/Chest: [x] Respiratory effort normal   [x] No visualized signs of difficulty breathing or respiratory distress        [] Abnormal -      Musculoskeletal:   [x] Normal gait with no signs of ataxia         [x] Normal range of motion of neck        [] Abnormal -     Neurological:        [x] No Facial Asymmetry (Cranial nerve 7 motor function) (limited exam due to video visit)          [x] No gaze palsy        [] Abnormal -          Skin:        [x] No significant exanthematous lesions or discoloration noted on facial skin         [] Abnormal -            Psychiatric:       [x] Normal Affect [] Abnormal -        [x] No Hallucinations    Other pertinent observable physical exam findings:-            [facility], was evaluated through a synchronous (real-time) audio-video encounter. The patient (or guardian if applicable) is aware that this is a billable service. Verbal consent to proceed has been obtained within the past 12 months. The visit was conducted pursuant to the emergency declaration under the [facility], [address] [place] [address] authority and the [employer] Act.   Patient identification was verified, and a caregiver was present when appropriate. The patient was located in a state where the provider was credentialed to provide care. An electronic signature was used to authenticate this note.   -- Indra Reid MD

## 2021-12-23 DIAGNOSIS — F41.9 ANXIETY: ICD-10-CM

## 2021-12-26 RX ORDER — CITALOPRAM 20 MG/1
TABLET, FILM COATED ORAL
Qty: 90 TABLET | Refills: 0 | Status: SHIPPED | OUTPATIENT
Start: 2021-12-26

## 2022-01-07 ENCOUNTER — PATIENT OUTREACH (OUTPATIENT)
Dept: CASE MANAGEMENT | Age: 32
End: 2022-01-07

## 2022-01-07 NOTE — PROGRESS NOTES
ACM reviewed EMR and contacted patient for follow up .  Unable to reach patient and left VM for return call with contact information for return call provided

## 2022-02-11 ENCOUNTER — PATIENT OUTREACH (OUTPATIENT)
Dept: CASE MANAGEMENT | Age: 32
End: 2022-02-11

## 2022-02-11 NOTE — PROGRESS NOTES
ACM attempted to reach patient for CM follow up. Number on file is no longer a working number. ACMALA left VM with spouse to request she have the patient return call to [facility] and contact information was provided.

## 2022-02-20 DIAGNOSIS — J45.40 MODERATE PERSISTENT ASTHMA WITHOUT COMPLICATION: ICD-10-CM

## 2022-02-20 RX ORDER — FLUTICASONE PROPIONATE AND SALMETEROL 50; 250 UG/1; UG/1
POWDER RESPIRATORY (INHALATION)
Qty: 180 EACH | Refills: 0 | Status: SHIPPED | OUTPATIENT
Start: 2022-02-20 | End: 2022-04-28

## 2022-02-28 ENCOUNTER — PATIENT OUTREACH (OUTPATIENT)
Dept: CASE MANAGEMENT | Age: 32
End: 2022-02-28

## 2022-02-28 NOTE — PROGRESS NOTES
[facility]'s third attempt to reach patient for CM follow up was unsuccessful. Previously no return calls from messages left . ACM left another VM for return call with contact info provided. [facility] closing episode at this time due to inability to reach the patient for follow up.

## 2022-03-01 DIAGNOSIS — J45.40 MODERATE PERSISTENT ASTHMA WITHOUT COMPLICATION: ICD-10-CM

## 2022-03-01 RX ORDER — MONTELUKAST SODIUM 10 MG/1
TABLET ORAL
Qty: 90 TABLET | Refills: 0 | Status: SHIPPED | OUTPATIENT
Start: 2022-03-01 | End: 2022-04-28

## 2022-03-19 PROBLEM — F31.73 BIPOLAR DISORDER, IN PARTIAL REMISSION, MOST RECENT EPISODE MANIC (HCC): Status: ACTIVE | Noted: 2021-12-13

## 2022-03-19 PROBLEM — F41.9 ANXIETY: Status: ACTIVE | Noted: 2020-08-18

## 2022-03-19 PROBLEM — J45.40 MODERATE PERSISTENT ASTHMA WITHOUT COMPLICATION: Status: ACTIVE | Noted: 2020-08-18

## 2022-04-28 DIAGNOSIS — J45.40 MODERATE PERSISTENT ASTHMA WITHOUT COMPLICATION: ICD-10-CM

## 2022-04-28 RX ORDER — MONTELUKAST SODIUM 10 MG/1
TABLET ORAL
Qty: 90 TABLET | Refills: 0 | Status: SHIPPED | OUTPATIENT
Start: 2022-04-28 | End: 2022-07-20 | Stop reason: SDUPTHER

## 2022-04-28 RX ORDER — ALBUTEROL SULFATE 90 UG/1
AEROSOL, METERED RESPIRATORY (INHALATION)
Qty: 18 EACH | Refills: 1 | Status: SHIPPED | OUTPATIENT
Start: 2022-04-28 | End: 2022-06-27

## 2022-04-28 RX ORDER — FLUTICASONE PROPIONATE AND SALMETEROL 50; 250 UG/1; UG/1
POWDER RESPIRATORY (INHALATION)
Qty: 180 EACH | Refills: 0 | Status: SHIPPED | OUTPATIENT
Start: 2022-04-28 | End: 2022-07-24

## 2022-05-08 ENCOUNTER — HOSPITAL ENCOUNTER (EMERGENCY)
Age: 32
Discharge: HOME | End: 2022-05-08
Attending: STUDENT IN AN ORGANIZED HEALTH CARE EDUCATION/TRAINING PROGRAM
Payer: COMMERCIAL

## 2022-05-08 ENCOUNTER — APPOINTMENT (OUTPATIENT)
Dept: CT IMAGING | Age: 32
End: 2022-05-08
Attending: STUDENT IN AN ORGANIZED HEALTH CARE EDUCATION/TRAINING PROGRAM
Payer: COMMERCIAL

## 2022-05-08 VITALS
RESPIRATION RATE: 16 BRPM | WEIGHT: 223.55 LBS | SYSTOLIC BLOOD PRESSURE: 139 MMHG | BODY MASS INDEX: 35.09 KG/M2 | DIASTOLIC BLOOD PRESSURE: 89 MMHG | HEART RATE: 78 BPM | OXYGEN SATURATION: 97 % | TEMPERATURE: 98.8 F | HEIGHT: 67 IN

## 2022-05-08 DIAGNOSIS — K52.9 ENTERITIS: Primary | ICD-10-CM

## 2022-05-08 DIAGNOSIS — R10.13 DYSPEPSIA: ICD-10-CM

## 2022-05-08 LAB
ALBUMIN SERPL-MCNC: 3.4 G/DL (ref 3.5–5)
ALBUMIN/GLOB SERPL: 0.9 {RATIO} (ref 1.1–2.2)
ALP SERPL-CCNC: 87 U/L (ref 45–117)
ALT SERPL-CCNC: 18 U/L (ref 12–78)
ANION GAP SERPL CALC-SCNC: 7 MMOL/L (ref 5–15)
APPEARANCE UR: CLEAR
AST SERPL-CCNC: 12 U/L (ref 15–37)
BACTERIA URNS QL MICRO: NEGATIVE /HPF
BASOPHILS # BLD: 0 K/UL (ref 0–0.1)
BASOPHILS NFR BLD: 0 % (ref 0–1)
BILIRUB SERPL-MCNC: 0.4 MG/DL (ref 0.2–1)
BILIRUB UR QL: NEGATIVE
BUN SERPL-MCNC: 9 MG/DL (ref 6–20)
BUN/CREAT SERPL: 13 (ref 12–20)
CALCIUM SERPL-MCNC: 8.5 MG/DL (ref 8.5–10.1)
CHLORIDE SERPL-SCNC: 103 MMOL/L (ref 97–108)
CO2 SERPL-SCNC: 27 MMOL/L (ref 21–32)
COLOR UR: ABNORMAL
CREAT SERPL-MCNC: 0.71 MG/DL (ref 0.55–1.02)
DIFFERENTIAL METHOD BLD: ABNORMAL
EOSINOPHIL # BLD: 0.4 K/UL (ref 0–0.4)
EOSINOPHIL NFR BLD: 4 % (ref 0–7)
EPITH CASTS URNS QL MICRO: ABNORMAL /LPF
ERYTHROCYTE [DISTWIDTH] IN BLOOD BY AUTOMATED COUNT: 16.3 % (ref 11.5–14.5)
GLOBULIN SER CALC-MCNC: 3.7 G/DL (ref 2–4)
GLUCOSE SERPL-MCNC: 86 MG/DL (ref 65–100)
GLUCOSE UR STRIP.AUTO-MCNC: NEGATIVE MG/DL
HCG UR QL: NEGATIVE
HCT VFR BLD AUTO: 36.8 % (ref 35–47)
HGB BLD-MCNC: 11.8 G/DL (ref 11.5–16)
HGB UR QL STRIP: NEGATIVE
IMM GRANULOCYTES # BLD AUTO: 0 K/UL (ref 0–0.04)
IMM GRANULOCYTES NFR BLD AUTO: 0 % (ref 0–0.5)
KETONES UR QL STRIP.AUTO: NEGATIVE MG/DL
LEUKOCYTE ESTERASE UR QL STRIP.AUTO: ABNORMAL
LIPASE SERPL-CCNC: 371 U/L (ref 73–393)
LYMPHOCYTES # BLD: 1.4 K/UL (ref 0.8–3.5)
LYMPHOCYTES NFR BLD: 16 % (ref 12–49)
MCH RBC QN AUTO: 26.2 PG (ref 26–34)
MCHC RBC AUTO-ENTMCNC: 32.1 G/DL (ref 30–36.5)
MCV RBC AUTO: 81.6 FL (ref 80–99)
MONOCYTES # BLD: 0.7 K/UL (ref 0–1)
MONOCYTES NFR BLD: 7 % (ref 5–13)
NEUTS SEG # BLD: 6.3 K/UL (ref 1.8–8)
NEUTS SEG NFR BLD: 73 % (ref 32–75)
NITRITE UR QL STRIP.AUTO: NEGATIVE
NRBC # BLD: 0 K/UL (ref 0–0.01)
NRBC BLD-RTO: 0 PER 100 WBC
PH UR STRIP: 7 [PH] (ref 5–8)
PLATELET # BLD AUTO: 396 K/UL (ref 150–400)
PMV BLD AUTO: 9.4 FL (ref 8.9–12.9)
POTASSIUM SERPL-SCNC: 4.1 MMOL/L (ref 3.5–5.1)
PROT SERPL-MCNC: 7.1 G/DL (ref 6.4–8.2)
PROT UR STRIP-MCNC: NEGATIVE MG/DL
RBC # BLD AUTO: 4.51 M/UL (ref 3.8–5.2)
RBC #/AREA URNS HPF: ABNORMAL /HPF (ref 0–5)
SODIUM SERPL-SCNC: 137 MMOL/L (ref 136–145)
SP GR UR REFRACTOMETRY: 1.01 (ref 1–1.03)
UR CULT HOLD, URHOLD: NORMAL
UROBILINOGEN UR QL STRIP.AUTO: 0.2 EU/DL (ref 0.2–1)
WBC # BLD AUTO: 8.7 K/UL (ref 3.6–11)
WBC URNS QL MICRO: ABNORMAL /HPF (ref 0–4)

## 2022-05-08 PROCEDURE — 83690 ASSAY OF LIPASE: CPT

## 2022-05-08 PROCEDURE — 81001 URINALYSIS AUTO W/SCOPE: CPT

## 2022-05-08 PROCEDURE — 80053 COMPREHEN METABOLIC PANEL: CPT

## 2022-05-08 PROCEDURE — 85025 COMPLETE CBC W/AUTO DIFF WBC: CPT

## 2022-05-08 PROCEDURE — 74011250636 HC RX REV CODE- 250/636: Performed by: STUDENT IN AN ORGANIZED HEALTH CARE EDUCATION/TRAINING PROGRAM

## 2022-05-08 PROCEDURE — 74011000636 HC RX REV CODE- 636: Performed by: STUDENT IN AN ORGANIZED HEALTH CARE EDUCATION/TRAINING PROGRAM

## 2022-05-08 PROCEDURE — 74177 CT ABD & PELVIS W/CONTRAST: CPT

## 2022-05-08 PROCEDURE — 36415 COLL VENOUS BLD VENIPUNCTURE: CPT

## 2022-05-08 PROCEDURE — 96372 THER/PROPH/DIAG INJ SC/IM: CPT

## 2022-05-08 PROCEDURE — 81025 URINE PREGNANCY TEST: CPT

## 2022-05-08 PROCEDURE — 74011250637 HC RX REV CODE- 250/637: Performed by: STUDENT IN AN ORGANIZED HEALTH CARE EDUCATION/TRAINING PROGRAM

## 2022-05-08 PROCEDURE — 99285 EMERGENCY DEPT VISIT HI MDM: CPT

## 2022-05-08 PROCEDURE — 74011000250 HC RX REV CODE- 250: Performed by: STUDENT IN AN ORGANIZED HEALTH CARE EDUCATION/TRAINING PROGRAM

## 2022-05-08 PROCEDURE — 96374 THER/PROPH/DIAG INJ IV PUSH: CPT

## 2022-05-08 RX ORDER — HYDROXYZINE HYDROCHLORIDE 10 MG/1
10 TABLET, FILM COATED ORAL
COMMUNITY

## 2022-05-08 RX ORDER — SERTRALINE HYDROCHLORIDE 100 MG/1
100 TABLET, FILM COATED ORAL DAILY
COMMUNITY

## 2022-05-08 RX ORDER — SUCRALFATE 1 G/10ML
1 SUSPENSION ORAL 4 TIMES DAILY
Qty: 414 ML | Refills: 0 | Status: SHIPPED | OUTPATIENT
Start: 2022-05-08 | End: 2022-05-15

## 2022-05-08 RX ORDER — ACETAMINOPHEN 325 MG/1
650 TABLET ORAL
Status: COMPLETED | OUTPATIENT
Start: 2022-05-08 | End: 2022-05-08

## 2022-05-08 RX ORDER — ONDANSETRON 4 MG/1
4 TABLET, ORALLY DISINTEGRATING ORAL
Qty: 12 TABLET | Refills: 0 | Status: SHIPPED | OUTPATIENT
Start: 2022-05-08

## 2022-05-08 RX ORDER — ONDANSETRON 4 MG/1
4 TABLET, ORALLY DISINTEGRATING ORAL
Status: COMPLETED | OUTPATIENT
Start: 2022-05-08 | End: 2022-05-08

## 2022-05-08 RX ORDER — DICYCLOMINE HYDROCHLORIDE 10 MG/ML
20 INJECTION INTRAMUSCULAR
Status: COMPLETED | OUTPATIENT
Start: 2022-05-08 | End: 2022-05-08

## 2022-05-08 RX ORDER — OMEPRAZOLE 40 MG/1
40 CAPSULE, DELAYED RELEASE ORAL DAILY
Qty: 30 CAPSULE | Refills: 0 | Status: SHIPPED | OUTPATIENT
Start: 2022-05-08

## 2022-05-08 RX ORDER — DICYCLOMINE HYDROCHLORIDE 20 MG/1
20 TABLET ORAL
Qty: 20 TABLET | Refills: 0 | Status: SHIPPED | OUTPATIENT
Start: 2022-05-08

## 2022-05-08 RX ADMIN — ONDANSETRON 4 MG: 4 TABLET, ORALLY DISINTEGRATING ORAL at 14:27

## 2022-05-08 RX ADMIN — SODIUM CHLORIDE 1000 ML: 9 INJECTION, SOLUTION INTRAVENOUS at 14:25

## 2022-05-08 RX ADMIN — LIDOCAINE HYDROCHLORIDE 40 ML: 20 SOLUTION ORAL; TOPICAL at 14:35

## 2022-05-08 RX ADMIN — FAMOTIDINE 20 MG: 10 INJECTION, SOLUTION INTRAVENOUS at 14:26

## 2022-05-08 RX ADMIN — IOPAMIDOL 100 ML: 755 INJECTION, SOLUTION INTRAVENOUS at 14:58

## 2022-05-08 RX ADMIN — ACETAMINOPHEN 650 MG: 325 TABLET ORAL at 14:33

## 2022-05-08 RX ADMIN — DICYCLOMINE HYDROCHLORIDE 20 MG: 20 INJECTION, SOLUTION INTRAMUSCULAR at 14:30

## 2022-05-08 NOTE — ED TRIAGE NOTES
Patient arrives ambulatory to ed with complaints of intermittent abdominal pain x 4 days. Patient has been taking miralax and stool softners. Last normal BM Wednesday.

## 2022-05-08 NOTE — ED PROVIDER NOTES
75-year-old woman with history of asthma, arthritis, depression, anxiety, uterine fibroids status post myomectomy presenting with abdominal pain over the past 4 days, worse after p.o. intake, located in the upper abdomen, states initially was in the left upper quadrant and now seems to be more in the right upper quadrant. Pain lasts for hours at a time. It does not radiate to the back or outside the abdominal cavity. She states that when she attempts to have a bowel movement is painful. She denies fevers but has had chills on and off. Denies dysuria or flank pain. She has not had any surgeries on her gastrointestinal tract.              Past Medical History:   Diagnosis Date    Arthritis     Asthma     Psychiatric disorder     depression and anxiety       Past Surgical History:   Procedure Laterality Date    HX GYN      fibroids    HX ORTHOPAEDIC      bilateral ankle surgery         Family History:   Problem Relation Age of Onset    Cancer Mother     Hypertension Father     Diabetes Father     Diabetes Brother     Asthma Maternal Grandmother     No Known Problems Paternal Grandmother     Hypertension Brother        Social History     Socioeconomic History    Marital status:      Spouse name: Not on file    Number of children: Not on file    Years of education: Not on file    Highest education level: Not on file   Occupational History    Not on file   Tobacco Use    Smoking status: Never Smoker    Smokeless tobacco: Never Used   Substance and Sexual Activity    Alcohol use: Yes     Comment: occ/socially-    Drug use: Yes     Types: Marijuana     Comment: Recreational/Insomnia     Sexual activity: Yes     Partners: Female     Birth control/protection: None   Other Topics Concern    Not on file   Social History Narrative    ** Merged History Encounter **          Social Determinants of Health     Financial Resource Strain:     Difficulty of Paying Living Expenses: Not on file   Food Insecurity:     Worried About Running Out of Food in the Last Year: Not on file    Dajuan of Food in the Last Year: Not on file   Transportation Needs:     Lack of Transportation (Medical): Not on file    Lack of Transportation (Non-Medical): Not on file   Physical Activity:     Days of Exercise per Week: Not on file    Minutes of Exercise per Session: Not on file   Stress:     Feeling of Stress : Not on file   Social Connections:     Frequency of Communication with Friends and Family: Not on file    Frequency of Social Gatherings with Friends and Family: Not on file    Attends Restorationism Services: Not on file    Active Member of [address] [place] [address] [employer] or Organizations: Not on file    Attends Club or Organization Meetings: Not on file    Marital Status: Not on file   Intimate Partner Violence:     Fear of Current or Ex-Partner: Not on file    Emotionally Abused: Not on file    Physically Abused: Not on file    Sexually Abused: Not on file   Housing Stability:     Unable to Pay for Housing in the Last Year: Not on file    Number of [place] in the Last Year: Not on file    Unstable Housing in the Last Year: Not on file         ALLERGIES: Aspirin, Nut - unspecified, Peanut, Penicillins, Percocet [oxycodone-acetaminophen], Aspirin, Codeine, Pcn [penicillins], Percocet [oxycodone-acetaminophen], and Sulfa (sulfonamide antibiotics)    Review of Systems   Constitutional: Positive for chills and fatigue. Negative for fever. Eyes: Negative for photophobia. Respiratory: Negative for shortness of breath. Cardiovascular: Negative for chest pain. Gastrointestinal: Positive for abdominal pain and nausea. Negative for diarrhea and vomiting. Genitourinary: Negative for dysuria and flank pain. Musculoskeletal: Negative for back pain. Neurological: Negative for light-headedness and headaches. Psychiatric/Behavioral: Negative for confusion. All other systems reviewed and are negative.       Vitals: 22 1351   BP: (!) 144/80   Pulse: 78   Resp: 16   Temp: 98.8 °F (37.1 °C)   SpO2: 99%   Weight: 101.4 kg (223 lb 8.7 oz)   Height: 5' 7\" (1.702 m)            Physical Exam  Vitals reviewed. Constitutional:       General: She is not in acute distress. Appearance: She is not toxic-appearing. HENT:      Head: Normocephalic and atraumatic. Mouth/Throat:      Mouth: Mucous membranes are moist.      Pharynx: Oropharynx is clear. Eyes:      Extraocular Movements: Extraocular movements intact. Cardiovascular:      Rate and Rhythm: Normal rate and regular rhythm. Heart sounds: Normal heart sounds. Pulmonary:      Effort: Pulmonary effort is normal. No respiratory distress. Breath sounds: Normal breath sounds. Abdominal:      Palpations: Abdomen is soft. Tenderness: There is abdominal tenderness in the right upper quadrant, epigastric area and left upper quadrant. There is no guarding or rebound. Positive signs include Cortez's sign. Musculoskeletal:         General: Normal range of motion. Cervical back: Normal range of motion. Right lower leg: No edema. Left lower leg: No edema. Skin:     General: Skin is warm and dry. Capillary Refill: Capillary refill takes less than 2 seconds. Coloration: Skin is not jaundiced. Neurological:      General: No focal deficit present. Mental Status: She is alert and oriented to person, place, and time.    Psychiatric:         Mood and Affect: Mood normal.          MDM       Procedures    MEDICAL DECISION MAKIN y.o. female presents with Abdominal Pain    Differential diagnosis includes but not limited to: Cholelithiasis, cholecystitis, gastroparesis, gastritis, proximal small bowel obstruction    LABORATORY TESTS:  Labs Reviewed   CBC WITH AUTOMATED DIFF - Abnormal; Notable for the following components:       Result Value    RDW 16.3 (*)     All other components within normal limits   URINALYSIS W/MICROSCOPIC - Abnormal; Notable for the following components:    Leukocyte Esterase TRACE (*)     All other components within normal limits   URINE CULTURE HOLD SAMPLE   METABOLIC PANEL, COMPREHENSIVE   LIPASE   HCG URINE, QL. - POC       IMAGING RESULTS:  CT ABD PELV W CONT    (Results Pending)       MEDICATIONS GIVEN:  Medications   sodium chloride 0.9 % bolus infusion 1,000 mL (has no administration in time range)   ondansetron (ZOFRAN ODT) tablet 4 mg (has no administration in time range)   mylanta/viscous lidocaine (GI COCKTAIL) (has no administration in time range)   famotidine (PF) (PEPCID) 20 mg in 0.9% sodium chloride 10 mL injection (has no administration in time range)   acetaminophen (TYLENOL) tablet 650 mg (has no administration in time range)   dicyclomine (BENTYL) 10 mg/mL injection 20 mg (has no administration in time range)   iopamidoL (ISOVUE-370) 76 % injection 100 mL (has no administration in time range)       PROGRESS NOTE:   3:53 PM Patient's symptoms have improved after treatment    EKG:  none completed    CONSULTS:  Discussed with family at bedside    IMPRESSION:  No diagnosis found. PLAN:  -   Discharge  Current Discharge Medication List      START taking these medications    Details   sucralfate (Carafate) 100 mg/mL suspension Take 10 mL by mouth four (4) times daily for 7 days. Make sure you take your other medications an hour before after taking this medicine. Qty: 414 mL, Refills: 0  Start date: 5/8/2022, End date: 5/15/2022      ondansetron (ZOFRAN ODT) 4 mg disintegrating tablet Take 1 Tablet by mouth every eight (8) hours as needed for Nausea. Qty: 12 Tablet, Refills: 0  Start date: 5/8/2022         CONTINUE these medications which have CHANGED    Details   dicyclomine (BENTYL) 20 mg tablet Take 1 Tablet by mouth every six (6) hours as needed for Abdominal Cramps. Qty: 20 Tablet, Refills: 0  Start date: 5/8/2022      omeprazole (PRILOSEC) 40 mg capsule Take 1 Capsule by mouth daily.   Qty: 30 Capsule, Refills: 0  Start date: 5/8/2022           Follow-up Information     Follow up With Specialties Details Why Contact Info    Jess Kidd MD Internal Medicine Physician, Bariatrics Schedule an appointment as soon as possible for a visit in 2 days Call for a follow up appointment. [facility]  [phone]          Return precautions given    Symptoms have improved somewhat. She appears to have enteritis on CT. This could be making her underlying gastritis more symptomatic. She has had symptoms of gastric irritation in the past and on EGD she recalls that she had gastric irritation although she is not able to recall all the specifics. She is not currently taking an antacid. We will send her home with Bentyl for abdominal cramping, and antacid such as omeprazole as well as Carafate and Tylenol as well as needed for pain. No acute surgical etiology. Will return if worsening pain       Janis Burks MD      Please note that this dictation was completed with [employer], the computer voice recognition software. Quite often unanticipated grammatical, syntax, homophones, and other interpretive errors are inadvertently transcribed by the computer software. Please disregard these errors. Please excuse any errors that have escaped final proofreading.

## 2022-05-12 ENCOUNTER — TELEPHONE (OUTPATIENT)
Dept: PRIMARY CARE CLINIC | Age: 32
End: 2022-05-12

## 2022-05-12 NOTE — TELEPHONE ENCOUNTER
Called and spoke with patient, told her she can spoke with pharm to get medication OTC for the time being.  Dr Faviola Murillo is out of office, patient had no issues with that

## 2022-05-12 NOTE — TELEPHONE ENCOUNTER
Patient calling because she forgot to pack her medication she in [place]. Wants to know if she can get 5 pill of her Singular sent to pharmacy in [place] where she is staying. [facility]  [address] BALLARD [address] [address] or  [facility] [id number]    Please call patient if this is possible. Did tell patient wasn't sure if rx could be sent there.

## 2022-05-12 NOTE — TELEPHONE ENCOUNTER
Called [facility] in [facility] [phone], patient is asking for an emergency supply. I gave with pharmacist patient info, NPI. I told him the she is asking for an emergency supply. We are not reodering due to patient being out of state  He said he will document. And see if her insurance will pay for the 5 tablets.

## 2022-06-27 DIAGNOSIS — J45.40 MODERATE PERSISTENT ASTHMA WITHOUT COMPLICATION: ICD-10-CM

## 2022-06-27 RX ORDER — ALBUTEROL SULFATE 90 UG/1
AEROSOL, METERED RESPIRATORY (INHALATION)
Qty: 8.5 EACH | Refills: 1 | Status: SHIPPED | OUTPATIENT
Start: 2022-06-27

## 2022-07-20 DIAGNOSIS — J45.40 MODERATE PERSISTENT ASTHMA WITHOUT COMPLICATION: ICD-10-CM

## 2022-07-20 RX ORDER — MONTELUKAST SODIUM 10 MG/1
10 TABLET ORAL DAILY
Qty: 90 TABLET | Refills: 0 | Status: SHIPPED | OUTPATIENT
Start: 2022-07-20 | End: 2022-08-03

## 2022-07-24 DIAGNOSIS — J45.40 MODERATE PERSISTENT ASTHMA WITHOUT COMPLICATION: ICD-10-CM

## 2022-07-24 RX ORDER — FLUTICASONE PROPIONATE AND SALMETEROL 50; 250 UG/1; UG/1
POWDER RESPIRATORY (INHALATION)
Qty: 180 EACH | Refills: 0 | Status: SHIPPED | OUTPATIENT
Start: 2022-07-24

## 2022-08-03 DIAGNOSIS — J45.40 MODERATE PERSISTENT ASTHMA WITHOUT COMPLICATION: ICD-10-CM

## 2022-08-03 RX ORDER — MONTELUKAST SODIUM 10 MG/1
TABLET ORAL
Qty: 90 TABLET | Refills: 0 | Status: SHIPPED | OUTPATIENT
Start: 2022-08-03